# Patient Record
Sex: MALE | Race: WHITE | NOT HISPANIC OR LATINO | Employment: FULL TIME | URBAN - METROPOLITAN AREA
[De-identification: names, ages, dates, MRNs, and addresses within clinical notes are randomized per-mention and may not be internally consistent; named-entity substitution may affect disease eponyms.]

---

## 2019-01-15 ENCOUNTER — OFFICE VISIT CONVERTED (OUTPATIENT)
Dept: FAMILY MEDICINE CLINIC | Facility: CLINIC | Age: 34
End: 2019-01-15
Attending: PHYSICIAN ASSISTANT

## 2019-01-16 ENCOUNTER — HOSPITAL ENCOUNTER (OUTPATIENT)
Dept: LAB | Facility: HOSPITAL | Age: 34
Discharge: HOME | End: 2019-01-16
Attending: PHYSICIAN ASSISTANT

## 2019-01-16 LAB
ALBUMIN SERPL-MCNC: 4.2 G/DL (ref 3.5–5)
ALBUMIN/GLOB SERPL: 1.2 {RATIO} (ref 1.4–2.6)
ALP SERPL-CCNC: 73 U/L (ref 53–128)
ALT SERPL-CCNC: 45 U/L (ref 10–40)
ANION GAP SERPL CALC-SCNC: 17 MMOL/L (ref 8–19)
AST SERPL-CCNC: 55 U/L (ref 15–50)
BILIRUB SERPL-MCNC: 0.39 MG/DL (ref 0.2–1.3)
BUN SERPL-MCNC: 12 MG/DL (ref 5–25)
BUN/CREAT SERPL: 13 {RATIO} (ref 6–20)
CALCIUM SERPL-MCNC: 9.1 MG/DL (ref 8.7–10.4)
CHLORIDE SERPL-SCNC: 102 MMOL/L (ref 99–111)
CHOLEST SERPL-MCNC: 125 MG/DL (ref 107–200)
CHOLEST/HDLC SERPL: 4.3 {RATIO} (ref 3–6)
CONV CO2: 26 MMOL/L (ref 22–32)
CONV TOTAL PROTEIN: 7.7 G/DL (ref 6.3–8.2)
CREAT UR-MCNC: 0.96 MG/DL (ref 0.7–1.2)
GFR SERPLBLD BASED ON 1.73 SQ M-ARVRAT: >60 ML/MIN/{1.73_M2}
GLOBULIN UR ELPH-MCNC: 3.5 G/DL (ref 2–3.5)
GLUCOSE SERPL-MCNC: 86 MG/DL (ref 70–99)
HDLC SERPL-MCNC: 29 MG/DL (ref 40–60)
LDLC SERPL CALC-MCNC: 50 MG/DL (ref 70–100)
OSMOLALITY SERPL CALC.SUM OF ELEC: 291 MOSM/KG (ref 273–304)
POTASSIUM SERPL-SCNC: 3.8 MMOL/L (ref 3.5–5.3)
SODIUM SERPL-SCNC: 141 MMOL/L (ref 135–147)
TRIGL SERPL-MCNC: 232 MG/DL (ref 40–150)
VLDLC SERPL-MCNC: 46 MG/DL (ref 5–37)

## 2020-01-09 ENCOUNTER — CONVERSION ENCOUNTER (OUTPATIENT)
Dept: FAMILY MEDICINE CLINIC | Facility: CLINIC | Age: 35
End: 2020-01-09

## 2020-01-09 ENCOUNTER — OFFICE VISIT CONVERTED (OUTPATIENT)
Dept: FAMILY MEDICINE CLINIC | Facility: CLINIC | Age: 35
End: 2020-01-09
Attending: PHYSICIAN ASSISTANT

## 2020-01-09 ENCOUNTER — HOSPITAL ENCOUNTER (OUTPATIENT)
Dept: LAB | Facility: HOSPITAL | Age: 35
Discharge: HOME | End: 2020-01-09
Attending: PHYSICIAN ASSISTANT

## 2020-01-09 LAB
ALBUMIN SERPL-MCNC: 4.4 G/DL (ref 3.5–5)
ALBUMIN/GLOB SERPL: 1.5 {RATIO} (ref 1.4–2.6)
ALP SERPL-CCNC: 67 U/L (ref 53–128)
ALT SERPL-CCNC: 19 U/L (ref 10–40)
ANION GAP SERPL CALC-SCNC: 18 MMOL/L (ref 8–19)
AST SERPL-CCNC: 18 U/L (ref 15–50)
BILIRUB SERPL-MCNC: 0.42 MG/DL (ref 0.2–1.3)
BUN SERPL-MCNC: 14 MG/DL (ref 5–25)
BUN/CREAT SERPL: 15 {RATIO} (ref 6–20)
CALCIUM SERPL-MCNC: 9.2 MG/DL (ref 8.7–10.4)
CHLORIDE SERPL-SCNC: 103 MMOL/L (ref 99–111)
CONV CO2: 24 MMOL/L (ref 22–32)
CONV TOTAL PROTEIN: 7.4 G/DL (ref 6.3–8.2)
CREAT UR-MCNC: 0.93 MG/DL (ref 0.7–1.2)
GFR SERPLBLD BASED ON 1.73 SQ M-ARVRAT: >60 ML/MIN/{1.73_M2}
GLOBULIN UR ELPH-MCNC: 3 G/DL (ref 2–3.5)
GLUCOSE SERPL-MCNC: 101 MG/DL (ref 70–99)
OSMOLALITY SERPL CALC.SUM OF ELEC: 293 MOSM/KG (ref 273–304)
POTASSIUM SERPL-SCNC: 3.7 MMOL/L (ref 3.5–5.3)
SODIUM SERPL-SCNC: 141 MMOL/L (ref 135–147)

## 2020-01-10 LAB
HSV1 IGG SER IA-ACNC: 31.2 INDEX (ref 0–0.9)
HSV2 IGG SER IA-ACNC: <0.91 INDEX (ref 0–0.9)

## 2020-01-11 LAB — CONV HIV COMBO AG/AB (HIV-1/O/2) WITH REFLEX: NEGATIVE

## 2020-01-13 LAB
C TRACH RRNA CVX QL NAA+PROBE: NOT DETECTED
CONV HEPATITIS B SURFACE AG W CONFIRMATION RE: NEGATIVE
CONV HEPATITIS COMMENT: NORMAL
HBV CORE AB SER DONR QL IA: NEGATIVE
HBV CORE IGM SERPL QL IA: NEGATIVE
HBV E AB SERPL QL IA: NEGATIVE
HBV E AG SERPL QL IA: NEGATIVE
HBV SURFACE AB SER QL: NON REACTIVE
HCV AB S/CO SERPL IA: <0.1 S/CO RATIO (ref 0–0.9)
HSV I/II IGM: 1 RATIO (ref 0–0.9)
HSV I/II IGM: 1.02 RATIO (ref 0–0.9)
HSV1 IGG SER IA-ACNC: 31.3 INDEX (ref 0–0.9)
HSV2 IGG SER IA-ACNC: <0.91 INDEX (ref 0–0.9)
N GONORRHOEA DNA SPEC QL NAA+PROBE: NOT DETECTED
RPR SER QL: ABNORMAL

## 2020-01-15 ENCOUNTER — HOSPITAL ENCOUNTER (OUTPATIENT)
Dept: LAB | Facility: HOSPITAL | Age: 35
Discharge: HOME | End: 2020-01-15
Attending: PHYSICIAN ASSISTANT

## 2020-01-15 LAB
APPEARANCE UR: CLEAR
BASOPHILS # BLD AUTO: 0.06 10*3/UL (ref 0–0.2)
BASOPHILS NFR BLD AUTO: 0.8 % (ref 0–3)
BILIRUB UR QL: NEGATIVE
CHOLEST SERPL-MCNC: 168 MG/DL (ref 107–200)
CHOLEST/HDLC SERPL: 5.8 {RATIO} (ref 3–6)
COLOR UR: YELLOW
CONV ABS IMM GRAN: 0.04 10*3/UL (ref 0–0.2)
CONV COLLECTION SOURCE (UA): NORMAL
CONV IMMATURE GRAN: 0.6 % (ref 0–1.8)
CONV UROBILINOGEN IN URINE BY AUTOMATED TEST STRIP: 0.2 {EHRLICHU}/DL (ref 0.1–1)
DEPRECATED RDW RBC AUTO: 42.9 FL (ref 35.1–43.9)
EOSINOPHIL # BLD AUTO: 0.31 10*3/UL (ref 0–0.7)
EOSINOPHIL # BLD AUTO: 4.3 % (ref 0–7)
ERYTHROCYTE [DISTWIDTH] IN BLOOD BY AUTOMATED COUNT: 12.5 % (ref 11.6–14.4)
GLUCOSE UR QL: NEGATIVE MG/DL
HCT VFR BLD AUTO: 40.5 % (ref 42–52)
HDLC SERPL-MCNC: 29 MG/DL (ref 40–60)
HGB BLD-MCNC: 13.6 G/DL (ref 14–18)
HGB UR QL STRIP: NEGATIVE
KETONES UR QL STRIP: NEGATIVE MG/DL
LDLC SERPL CALC-MCNC: 67 MG/DL (ref 70–100)
LEUKOCYTE ESTERASE UR QL STRIP: NEGATIVE
LYMPHOCYTES # BLD AUTO: 2.35 10*3/UL (ref 1–5)
LYMPHOCYTES NFR BLD AUTO: 32.7 % (ref 20–45)
MCH RBC QN AUTO: 31.2 PG (ref 27–31)
MCHC RBC AUTO-ENTMCNC: 33.6 G/DL (ref 33–37)
MCV RBC AUTO: 92.9 FL (ref 80–96)
MONOCYTES # BLD AUTO: 0.63 10*3/UL (ref 0.2–1.2)
MONOCYTES NFR BLD AUTO: 8.8 % (ref 3–10)
NEUTROPHILS # BLD AUTO: 3.79 10*3/UL (ref 2–8)
NEUTROPHILS NFR BLD AUTO: 52.8 % (ref 30–85)
NITRITE UR QL STRIP: NEGATIVE
NRBC CBCN: 0 % (ref 0–0.7)
PH UR STRIP.AUTO: 5.5 [PH] (ref 5–8)
PLATELET # BLD AUTO: 226 10*3/UL (ref 130–400)
PMV BLD AUTO: 11.4 FL (ref 9.4–12.4)
PROT UR QL: NEGATIVE MG/DL
RBC # BLD AUTO: 4.36 10*6/UL (ref 4.7–6.1)
SP GR UR: 1.03 (ref 1–1.03)
T4 FREE SERPL-MCNC: 1.1 NG/DL (ref 0.9–1.8)
TRIGL SERPL-MCNC: 589 MG/DL (ref 40–150)
TSH SERPL-ACNC: 1.4 M[IU]/L (ref 0.27–4.2)
WBC # BLD AUTO: 7.18 10*3/UL (ref 4.8–10.8)

## 2020-01-27 ENCOUNTER — HOSPITAL ENCOUNTER (OUTPATIENT)
Dept: LAB | Facility: HOSPITAL | Age: 35
Discharge: HOME | End: 2020-01-27
Attending: PHYSICIAN ASSISTANT

## 2020-01-27 LAB
CHOLEST SERPL-MCNC: 153 MG/DL (ref 107–200)
CHOLEST/HDLC SERPL: 5.9 {RATIO} (ref 3–6)
FERRITIN SERPL-MCNC: 231 NG/ML (ref 30–300)
FOLATE SERPL-MCNC: 9.8 NG/ML (ref 4.8–20)
HDLC SERPL-MCNC: 26 MG/DL (ref 40–60)
IRON SATN MFR SERPL: 20 % (ref 20–55)
IRON SERPL-MCNC: 71 UG/DL (ref 70–180)
LDLC SERPL CALC-MCNC: 57 MG/DL (ref 70–100)
TIBC SERPL-MCNC: 353 UG/DL (ref 245–450)
TRANSFERRIN SERPL-MCNC: 247 MG/DL (ref 215–365)
TRIGL SERPL-MCNC: 348 MG/DL (ref 40–150)
VIT B12 SERPL-MCNC: 342 PG/ML (ref 211–911)
VLDLC SERPL-MCNC: 70 MG/DL (ref 5–37)

## 2020-02-24 ENCOUNTER — OFFICE VISIT CONVERTED (OUTPATIENT)
Dept: FAMILY MEDICINE CLINIC | Facility: CLINIC | Age: 35
End: 2020-02-24
Attending: PHYSICIAN ASSISTANT

## 2020-02-24 ENCOUNTER — HOSPITAL ENCOUNTER (OUTPATIENT)
Dept: LAB | Facility: HOSPITAL | Age: 35
Discharge: HOME | End: 2020-02-24
Attending: PHYSICIAN ASSISTANT

## 2020-02-26 LAB — BACTERIA SPEC AEROBE CULT: NORMAL

## 2020-02-28 ENCOUNTER — HOSPITAL ENCOUNTER (OUTPATIENT)
Dept: GENERAL RADIOLOGY | Facility: HOSPITAL | Age: 35
Discharge: HOME | End: 2020-02-28
Attending: PHYSICIAN ASSISTANT

## 2020-03-24 ENCOUNTER — TELEMEDICINE CONVERTED (OUTPATIENT)
Dept: FAMILY MEDICINE CLINIC | Facility: CLINIC | Age: 35
End: 2020-03-24
Attending: PHYSICIAN ASSISTANT

## 2020-06-15 ENCOUNTER — OUTSIDE FACILITY SERVICE (OUTPATIENT)
Dept: SLEEP MEDICINE | Facility: HOSPITAL | Age: 35
End: 2020-06-15

## 2020-06-15 ENCOUNTER — HOSPITAL ENCOUNTER (OUTPATIENT)
Dept: SLEEP MEDICINE | Facility: HOSPITAL | Age: 35
Discharge: HOME | End: 2020-06-15
Attending: INTERNAL MEDICINE

## 2020-06-15 PROCEDURE — 99244 OFF/OP CNSLTJ NEW/EST MOD 40: CPT | Performed by: INTERNAL MEDICINE

## 2020-06-23 ENCOUNTER — HOSPITAL ENCOUNTER (OUTPATIENT)
Dept: SLEEP MEDICINE | Facility: HOSPITAL | Age: 35
Discharge: HOME | End: 2020-06-23
Attending: INTERNAL MEDICINE

## 2020-06-29 ENCOUNTER — OUTSIDE FACILITY SERVICE (OUTPATIENT)
Dept: SLEEP MEDICINE | Facility: HOSPITAL | Age: 35
End: 2020-06-29

## 2020-06-29 PROCEDURE — 95806 SLEEP STUDY UNATT&RESP EFFT: CPT | Performed by: INTERNAL MEDICINE

## 2020-07-06 ENCOUNTER — HOSPITAL ENCOUNTER (OUTPATIENT)
Dept: SLEEP MEDICINE | Facility: HOSPITAL | Age: 35
Discharge: HOME | End: 2020-07-06
Attending: INTERNAL MEDICINE

## 2020-07-06 ENCOUNTER — OUTSIDE FACILITY SERVICE (OUTPATIENT)
Dept: SLEEP MEDICINE | Facility: HOSPITAL | Age: 35
End: 2020-07-06

## 2020-07-06 PROCEDURE — 99214 OFFICE O/P EST MOD 30 MIN: CPT | Performed by: INTERNAL MEDICINE

## 2020-07-26 ENCOUNTER — HOSPITAL ENCOUNTER (OUTPATIENT)
Dept: SLEEP MEDICINE | Facility: HOSPITAL | Age: 35
Discharge: HOME | End: 2020-07-26
Attending: INTERNAL MEDICINE

## 2020-07-27 ENCOUNTER — OUTSIDE FACILITY SERVICE (OUTPATIENT)
Dept: SLEEP MEDICINE | Facility: HOSPITAL | Age: 35
End: 2020-07-27

## 2020-07-27 PROCEDURE — 95810 POLYSOM 6/> YRS 4/> PARAM: CPT | Performed by: INTERNAL MEDICINE

## 2020-09-23 ENCOUNTER — OUTSIDE FACILITY SERVICE (OUTPATIENT)
Dept: SLEEP MEDICINE | Facility: HOSPITAL | Age: 35
End: 2020-09-23

## 2020-09-23 ENCOUNTER — HOSPITAL ENCOUNTER (OUTPATIENT)
Dept: SLEEP MEDICINE | Facility: HOSPITAL | Age: 35
Discharge: HOME | End: 2020-09-23
Attending: INTERNAL MEDICINE

## 2020-09-23 PROCEDURE — 99213 OFFICE O/P EST LOW 20 MIN: CPT | Performed by: INTERNAL MEDICINE

## 2021-01-11 ENCOUNTER — HOSPITAL ENCOUNTER (OUTPATIENT)
Dept: GENERAL RADIOLOGY | Facility: HOSPITAL | Age: 36
Discharge: HOME | End: 2021-01-11
Attending: PHYSICIAN ASSISTANT

## 2021-01-11 ENCOUNTER — TELEMEDICINE CONVERTED (OUTPATIENT)
Dept: FAMILY MEDICINE CLINIC | Facility: CLINIC | Age: 36
End: 2021-01-11
Attending: PHYSICIAN ASSISTANT

## 2021-04-12 ENCOUNTER — TELEMEDICINE CONVERTED (OUTPATIENT)
Dept: FAMILY MEDICINE CLINIC | Facility: CLINIC | Age: 36
End: 2021-04-12
Attending: PHYSICIAN ASSISTANT

## 2021-04-27 ENCOUNTER — OFFICE VISIT CONVERTED (OUTPATIENT)
Dept: OTOLARYNGOLOGY | Facility: CLINIC | Age: 36
End: 2021-04-27
Attending: NURSE PRACTITIONER

## 2021-04-29 ENCOUNTER — CONVERSION ENCOUNTER (OUTPATIENT)
Dept: FAMILY MEDICINE CLINIC | Facility: CLINIC | Age: 36
End: 2021-04-29

## 2021-04-29 ENCOUNTER — HOSPITAL ENCOUNTER (OUTPATIENT)
Dept: GENERAL RADIOLOGY | Facility: HOSPITAL | Age: 36
Discharge: HOME | End: 2021-04-29
Attending: PHYSICIAN ASSISTANT

## 2021-04-29 ENCOUNTER — OFFICE VISIT CONVERTED (OUTPATIENT)
Dept: FAMILY MEDICINE CLINIC | Facility: CLINIC | Age: 36
End: 2021-04-29
Attending: PHYSICIAN ASSISTANT

## 2021-04-29 LAB
AMPHET UR QL CFM: NEGATIVE
BARBITURATES UR QL: NEGATIVE
BENZODIAZ UR QL SCN: NEGATIVE
BILIRUB UR QL STRIP: NEGATIVE
COLOR UR: NORMAL
CONV AMP/METHAMP UR: NEGATIVE
CONV CLARITY OF URINE: NORMAL
CONV COCAINE, UR: NEGATIVE
CONV PROTEIN IN URINE BY AUTOMATED TEST STRIP: NEGATIVE
CONV UROBILINOGEN IN URINE BY AUTOMATED TEST STRIP: NORMAL
GLUCOSE UR QL: NEGATIVE
HGB UR QL STRIP: NEGATIVE
KETONES UR QL STRIP: NEGATIVE
LEUKOCYTE ESTERASE UR QL STRIP: NEGATIVE
MDMA UR QL SCN: NEGATIVE
METHADONE UR QL SCN: NEGATIVE
NITRITE UR QL STRIP: NEGATIVE
OPIATES UR QL SCN: NEGATIVE
OXYCODONE UR QL SCN: NEGATIVE
PCP UR QL: NEGATIVE
PH UR STRIP.AUTO: 5.5 [PH]
SP GR UR: 1.03
THC SERPLBLD CFM-MCNC: NEGATIVE NG/ML

## 2021-05-11 NOTE — H&P
History and Physical      Patient Name: Ketan Mathias   Patient ID: [id number]   Sex: Male   YOB: 1985    Primary Care Provider: Zeferino Coon PA-C   Referring Provider: Zeferino Coon PA-C    Visit Date: April 27, 2021    Provider: SAMEER Crum   Location: [facility]   Location Address: [address] [place] [address], [address] [place] [address]  [id number]   Location Phone: [phone]          Chief Complaint     1. Left otalgia    2.  Hearing changes    3.  Cerumen impactions       History Of Present Illness  Ketan Mathias is a 35 year old /White male who presents to the office today as a consult from Zeferino Coon PA-C.      He presents the clinic today for evaluation of his left ear.  He reports for the past 10 years he has had intermittent issues with sharp shooting pain within his left ear.  He states that recently he has began to hear a static sound in his left ear and will occasionally feel like he cannot hear out of that side.  He states that the pain in his ear feels deep and sometimes radiates down into his neck.  He denies any pain with chewing or bruxism.  He does have some allergy symptoms for which he takes cetirizine and Flonase daily.  He reports in 2005 he did have a right-sided tympanoplasty for a TM perforation.  He states that he did have some issues with ear infections as a child but never had any ear tubes.  He denies any otorrhea.  He feels like typically his hearing is normal and is not having any trouble understanding people on a day-to-day basis.  He was recently diagnosed with obstructive sleep apnea and uses a CPAP machine.       Past Medical History  Otalgia of left ear; Sleep apnea         Past Surgical History  Arm Surgery; Ear drum repair         Medication List  diclofenac sodium 75 mg oral tablet,delayed release (DR/EC); fenofibrate 160 mg oral tablet; Flonase Allergy Relief 50 mcg/actuation nasal spray,suspension; Lexapro 20 mg oral tablet; Zyrtec 10 mg  How Severe Is Your Skin Lesion?: mild "oral tablet         Allergy List  NO KNOWN DRUG ALLERGIES         Family Medical History  Family history of breast cancer; Family history of diabetes mellitus         Social History  Alcohol (Never); Exercises regularly; History of methamphetamine use; ; ; No known infection risk; Tobacco (Former)         Immunizations  Name Date Admin   Influenza 12/01/2018         Review of Systems  · Constitutional  o Denies  o : fever, night sweats, weight loss  · Eyes  o Denies  o : discharge from eye, impaired vision  · HENT  o Admits  o : *See HPI  · Cardiovascular  o Denies  o : chest pain, irregular heart beats  · Respiratory  o Denies  o : shortness of breath, wheezing, coughing up blood  · Gastrointestinal  o Denies  o : heartburn, reflux, vomiting blood  · Genitourinary  o Denies  o : frequency  · Integument  o Denies  o : rash, skin dryness  · Neurologic  o Denies  o : seizures, loss of balance, loss of consciousness, dizziness  · Endocrine  o Denies  o : cold intolerance, heat intolerance  · Heme-Lymph  o Denies  o : easy bleeding, anemia      Vitals  Date Time BP Position Site L\R Cuff Size HR RR TEMP (F) WT  HT  BMI kg/m2 BSA m2 O2 Sat FR L/min FiO2 HC       04/27/2021 10:09 AM        97.5 232lbs 8oz 6'  2\" 29.85 2.35             Physical Examination  · Constitutional  o Appearance  o : well developed, well-nourished, alert and in no acute distress, voice clear and strong  · Head and Face  o Head  o :   § Inspection  § : no deformities or lesions  o Face  o :   § Inspection  § : No facial lesions; House-Brackmann I/VI bilaterally  § Palpation  § : No TMJ crepitus nor  muscle tenderness bilaterally  · Eyes  o Vision  o :   § Visual Fields  § : Extraocular movements are intact. No spontaneous or gaze-induced nystagmus.  o Conjunctivae  o : clear  o Sclerae  o : clear  o Pupils and Irises  o : pupils equal, round, and reactive to light.   · Ears, Nose, Mouth and Throat  o Ears  o :   § External " Has Your Skin Lesion Been Treated?: not been treated Ears  § : appearance within normal limits, no lesions present  § Otoscopic Examination  § : Complete cerumen impactions, cleared under the microscope using suction and curettes, tympanic membrane appearance within normal limits bilaterally without perforations, well-aerated middle ears  § Hearing  § : intact to conversational voice both ears  o Nose  o :   § External Nose  § : appearance normal  § Intranasal Exam  § : mucosa within normal limits, vestibules normal, no intranasal lesions present, septum midline, sinuses non tender to percussion  o Oral Cavity  o :   § Oral Mucosa  § : oral mucosa normal without pallor or cyanosis  § Lips  § : lip appearance normal  § Teeth  § : Edentulous on the top  § Gums  § : gums pink, non-swollen, no bleeding present  § Tongue  § : tongue appearance normal; normal mobility  § Palate  § : hard palate normal, soft palate appearance normal with symmetric mobility  o Throat  o :   § Oropharynx  § : no inflammation or lesions present, tonsils within normal limits  · Neck  o Inspection/Palpation  o : normal appearance, no masses or tenderness, trachea midline; thyroid size normal, nontender, no nodules or masses present on palpation  · Respiratory  o Respiratory Effort  o : breathing unlabored  o Inspection of Chest  o : normal appearance, no retractions  · Lymphatic  o Neck  o : no lymphadenopathy present  o Supraclavicular Nodes  o : no lymphadenopathy present  o Preauricular Nodes  o : no lymphadenopathy present  · Skin and Subcutaneous Tissue  o General Inspection  o : Regarding face and neck - there are no rashes present, no lesions present, and no areas of discoloration  · Neurologic  o Cranial Nerves  o : cranial nerves II-XII are grossly intact bilaterally  o Gait and Station  o : normal gait, able to stand without diffculty  · Psychiatric  o Judgement and Insight  o : judgment and insight intact  o Mood and Affect  o : mood normal, affect  Is This A New Presentation, Or A Follow-Up?: Skin Lesions appropriate          Assessment  · Hearing loss     389.9/H91.90  · Impacted cerumen, bilateral     380.4/H61.23  · Otalgia, left     388.70/H92.02      Plan  · Orders  o Removal of impacted cerumen (19219) - 380.4/H61.23 - 04/27/2021  · Medications  o Medications have been Reconciled  o Transition of Care or Provider Policy  · Instructions  o She presents to the clinic today for evaluation of her left ear. She reports that for the past 2 to 3 weeks she has felt like there is something moving around in her left ear. She states that she can hear a clicking and rummaging sound in her ear. She states there is occasionally a ringing sound in her ear as well. She states she has a known history of hearing loss on the right side and reports that she does not think she is unable to hear anything out of her right ear since she was a child. She states that was the last time she had a hearing test. She denies any issues with recurrent otitis media or ear surgeries. She denies significant noise exposure history. On examination today he has complete cerumen impactions present bilaterally. I was able to completely clear these under the microscope using suction and alligator forceps. The cerumen was extensive, in the canal all the way back up against the tympanic membrane. Once he got this cleared he did feel as though he could hear better. Bilateral tympanic membrane appearance is normal. There are no perforations and middle ears are well aerated. We discussed that the extent of his cerumen impactions could be causing some pain and changes to his hearing within his ears. I would like to see him back in 1 month at which time if he is continuing to have issues with hearing changes and ear pain we will get an audiogram and tympanogram at that time.  o Electronically Identified Patient Education Materials Provided Electronically  · Correspondence  o ENT Letter to Referring MD (Zeferino Coon PA-C) - 04/27/2021            Electronically  Signed by: Annelise Church APRN -Author on April 27, 2021 11:20:07 AM

## 2021-05-14 ENCOUNTER — HOSPITAL ENCOUNTER (OUTPATIENT)
Dept: GENERAL RADIOLOGY | Facility: HOSPITAL | Age: 36
Discharge: HOME | End: 2021-05-14
Attending: PHYSICIAN ASSISTANT

## 2021-05-14 VITALS
BODY MASS INDEX: 29.93 KG/M2 | DIASTOLIC BLOOD PRESSURE: 71 MMHG | OXYGEN SATURATION: 97 % | WEIGHT: 233.25 LBS | HEART RATE: 51 BPM | SYSTOLIC BLOOD PRESSURE: 110 MMHG | HEIGHT: 74 IN

## 2021-05-14 VITALS — BODY MASS INDEX: 29.84 KG/M2 | HEIGHT: 74 IN | TEMPERATURE: 97.5 F | WEIGHT: 232.5 LBS

## 2021-05-14 NOTE — PROGRESS NOTES
Progress Note      Patient Name: Ketan Mathias   Patient ID: [id number]   Sex: Male   YOB: 1985    Primary Care Provider: Zeferino Coon PA-C   Referring Provider: Zeferino Coon PA-C    Visit Date: January 11, 2021    Provider: Zeferino Coon PA-C   Location: [facility]   Location Address: [address] [place] [address], [address]  [place], [place]  [id number]   Location Phone: [phone]          Chief Complaint  · 1 yr follow up  · neck, shoulder, back pain  · discuss depression  · numbness in rt leg      History Of Present Illness  Video Conferencing Visit  Ketan Mathias is a 35 year old male who is presenting for evaluation via video conferencing via [employer]. Verbal consent obtained before beginning visit.   The following staff were present during this visit: Sarah Hearn CMA/Zeferino Coon PA-C   Ketan Mathias is a 35 year old male who presents for evaluation and treatment of: 1 year follow up.      pt presents today for 1 year follow up.    pt states for the couple of weeks he has been experiencing pain starting on rt side of his neck going into his shoulder and down to his lower back. pt states at times the pain does radiate down his leg to the bottom of his calf. pt does have numbness in his lower back. pt denies any injury states he just woke up one morning in pain.  Right neck and shoulder pain, no injury noted.  He does do manual labor.  He is also having right lower back pain.  No urinary incont      pt states he would like to discuss his depression issues. pt was currently taking Zoloft 50mg qd; however he has stopped taking it about 2 months ago due to the fact he stated it made him feel numb with no emotion. pt states he is still having issues with depression and would like to see if there is something else he could try.  He states that he was numb to everything.  No SI/HI    Labs 4/20  flu refused    Hyperlipidemia - fair control - pt is taking fenofibrate, needs labs         Past  Medical History  Disease Name Date Onset Notes   Sleep apnea --  --          Medication List  Name Date Started Instructions   fenofibrate 160 mg oral tablet 02/24/2020 take 1 tablet (160 mg) by oral route once daily   Levsin 0.125 mg oral tablet 03/24/2020 take 1 tablet (0.125 mg) by oral route 4 times per day         Allergy List  Allergen Name Date Reaction Notes   NO KNOWN DRUG ALLERGIES --  --  --          Social History  Finding Status Start/Stop Quantity Notes   Alcohol Never --/-- --  --    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   Former smoker --  --/-- --  quit at age 32   History of methamphetamine use --  --/-- --  --     --  --/-- --  --     --  --/-- --  --    No known infection risk --  --/-- --  --    Tobacco Former 20/25 socially --          Immunizations  NameDate Admin Mfg Trade Name Lot Number Route Inj VIS Given VIS Publication   [id number]/01/2018 SKB Fluzone Quadrivalent  NE NE 01/15/2019    Comments:          Review of Systems  · Constitutional  o Denies  o : fever, fatigue, weight loss, weight gain  · Cardiovascular  o Denies  o : lower extremity edema, claudication, chest pressure, palpitations  · Respiratory  o Denies  o : shortness of breath, wheezing, cough, hemoptysis, dyspnea on exertion  · Gastrointestinal  o Denies  o : nausea, vomiting, diarrhea, constipation, abdominal pain      Physical Examination  · Constitutional  o Appearance  o : well-nourished, no acute distress  · Head and Face  o Head  o :   § Inspection  § : atraumatic, normocephalic  · Respiratory  o Respiratory Effort  o : breathing comfortably, no cough  · Skin and Subcutaneous Tissue  o General Inspection  o : no visible rash, no lesions  · Neurologic  o Mental Status Examination  o :   § Orientation  § : grossly oriented to person, place and time, no facial droop     Neck - palp tend along the trap full rom of right shoulder  BACK - lumbar and right gluteal tenderness on video            Assessment  · Anxiety disorder     300.00/F41.9  · Cervical pain (neck)     723.1/M54.2  · Depression     311/F32.9  · Lumbago     724.2/M54.5  · Need for influenza vaccination     V04.81/Z23  · Shoulder pain, right     719.41/M25.511  · Lower back pain     724.2/M54.5  · Neck pain on right side     723.1/M54.2      Plan  · Orders  o Cervical Spine Complete [facility] (45468) - 723.1/M54.2 - 01/11/2021  o Lumbar Spine Complete [facility] (16745) - 724.2/M54.5 - 01/11/2021  o ACO-14: Influenza immunization was not administered for reasons documented () - V04.81/Z23 - 01/11/2021   refused  o Free T4 (90199) - - 01/11/2021  o Physical, Primary Care Panel (CBC, CMP, Lipid, TSH) [facility] (56535, 62427, 03706, 05277) - - 01/11/2021  o Urinalysis with Reflex Microscopy ([facility]) (72363) - - 01/11/2021  o Vitamin D (25-Hydroxy) Level (61793) - - 01/11/2021  o ACO-39: Current medications updated and reviewed (1159F, ) - - 01/11/2021  · Medications  o Lexapro 10 mg oral tablet   SIG: take 1 tablet (10 mg) by oral route once daily for 30 days   DISP: (30) Tablet with 2 refills  Prescribed on 01/11/2021     o diclofenac sodium 75 mg oral tablet,delayed release (DR/EC)   SIG: take 1 tablet (75 mg) by oral route 2 times per day   DISP: (60) Tablet with 0 refills  Prescribed on 01/11/2021     o Medications have been Reconciled  o Transition of Care or Provider Policy  · Instructions  o Flu vaccine declined.  o Patient was educated/instructed on their diagnosis, treatment and medications prior to discharge from the clinic today.            Electronically Signed by: Zeferino Coon PA-C -Author on January 11, 2021 01:46:53 PM

## 2021-05-14 NOTE — PROGRESS NOTES
Progress Note      Patient Name: Ketan Mathias   Patient ID: [id number]   Sex: Male   YOB: 1985    Primary Care Provider: Zeferino Coon PA-C   Referring Provider: Zeferino Coon PA-C    Visit Date: April 29, 2021    Provider: Zeferino Coon PA-C   Location: [facility] [place]   Location Address: [address] [place] [address], [address]  [place], [place]  [id number]   Location Phone: [phone]          Chief Complaint  · bilateral sciatic pain      History Of Present Illness  Ketan Mathias is a 35 year old /White male who presents for evaluation and treatment of: bilateral sciatic pain.      pt presents today for bilateral sciatic pain.    pt states he started having bilateral sciatic pain last Monday when he got out of bed. pt states the pain radiates down both legs with numbness. pt states the pain is better when he sits and is at its worse when he is walking. pt states his pain is 7/8 out of 10. pt states he has this about every 4 months.    Labs 2/20-pending    Pt states that he has not had any workup of this issue.  Pt states that he has bilat pain in both legs down to the calves.   Right worse than left.    No urinary or fecal incont    No injury noted.  Began around 1 year ago, he states that the pain is constant.    He cant afford chiro or PT.    Pt has taken Tylenol and IBU and neither have helped.    Anxiety and depression controlled with Lexapro  hyperlipidemia - controlled with fenofibrate       Past Medical History  Disease Name Date Onset Notes   Otalgia of left ear --  --    Sleep apnea --  --          Past Surgical History  Procedure Name Date Notes   Arm Surgery 1999,2002 dog bite and broken bone   Ear drum repair 2005 --          Medication List  Name Date Started Instructions   diclofenac sodium 75 mg oral tablet,delayed release (DR/EC) 01/11/2021 take 1 tablet (75 mg) by oral route 2 times per day   fenofibrate 160 mg oral tablet 02/24/2020 take 1 tablet (160 mg) by oral route  "once daily   Flonase Allergy Relief 50 mcg/actuation nasal spray,suspension 04/12/2021 spray 1 spray (50 mcg) in each nostril by intranasal route once daily   Lexapro 20 mg oral tablet 04/12/2021 take 1 tablet (20 mg) by oral route once daily for 30 days   Zyrtec 10 mg oral tablet 04/12/2021 take 1 tablet (10 mg) by oral route once daily for 30 days         Allergy List  Allergen Name Date Reaction Notes   NO KNOWN DRUG ALLERGIES --  --  --        Allergies Reconciled  Family Medical History  Disease Name Relative/Age Notes   Family history of breast cancer Grandmother (maternal)/   --    Family history of diabetes mellitus Aunt/  Father/  Mother/  Sister/   --          Social History  Finding Status Start/Stop Quantity Notes   Alcohol Never --/-- --  --    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   History of methamphetamine use --  --/-- --  --     --  --/-- --  --     --  --/-- --  --    No known infection risk --  --/-- --  --    Tobacco Former 20/33 1 ppd quit 2018         Immunizations  NameDate Admin Mfg Trade Name Lot Number Route Inj VIS Given VIS Publication   [id number]/01/2018 SKB Fluzone Quadrivalent  NE NE 01/15/2019    Comments:          Review of Systems  · Constitutional  o Denies  o : fever, fatigue, weight loss, weight gain  · Cardiovascular  o Denies  o : lower extremity edema, claudication, chest pressure, palpitations  · Respiratory  o Denies  o : shortness of breath, wheezing, cough, hemoptysis, dyspnea on exertion  · Gastrointestinal  o Denies  o : nausea, vomiting, diarrhea, constipation, abdominal pain      Vitals  Date Time BP Position Site L\R Cuff Size HR RR TEMP (F) WT  HT  BMI kg/m2 BSA m2 O2 Sat FR L/min FiO2 HC       04/29/2021 07:16 /71 Sitting    51 - R   233lbs 4oz 6'  2\" 29.95 2.35 97 %            Physical Examination  · Constitutional  o Appearance  o : well developed, well-nourished, no acute distress  · Head and Face  o Head  o : normocephalic, " atraumatic  · Neck  o Inspection/Palpation  o : normal appearance, no masses or tenderness, trachea midline  o Thyroid  o : gland size normal, nontender, no nodules or masses present on palpation  · Respiratory  o Respiratory Effort  o : breathing unlabored  o Inspection of Chest  o : chest rise symmetric bilaterally  o Auscultation of Lungs  o : clear to auscultation bilaterally throughout inspiration and expiration  · Cardiovascular  o Heart  o :   § Auscultation of Heart  § : regular rate and rhythm, no murmurs, gallops or rubs  o Peripheral Vascular System  o :   § Extremities  § : no edema     BACK - palp tend in the lumbar spine, neg SLR, no weakness             Results  · In-Office Procedures  o Lab procedure  § IOP - Urine Drug Screen In-House [facility] (25600)   § Amphetamines Ur Ql: Negative   § Barbiturates Ur Ql: Negative   § Buprenorphine+Nor Ur Ql Scn: Negative   § Benzodiaz Ur Ql: Negative   § Cocaine Ur Ql: Negative   § Methadone Ur Ql: Negative   § Methamphet Ur Ql: Negative   § MDMA Ur Ql Scn: Negative   § Opiates Ur Ql: Negative   § Oxycodone Ur Ql: Negative   § PCP Ur Ql: Negative   § THC Ur Ql: Negative   § Temp in Range?: Within/Acceptable   § Control Seen?: Yes   § IOP - Urinalysis without Microscopy ([facility]) [facility] (77442)   § Color Ur: Dark yellow   § Clarity Ur: Cloudy   § Glucose Ur Ql Strip: Negative   § Bilirub Ur Ql Strip: Negative   § Ketones Ur Ql Strip: Negative   § Sp Gr Ur Qn: 1.030   § Hgb Ur Ql Strip: Negative   § pH Ur-LsCnc: 5.5   § Prot Ur Ql Strip: Negative   § Urobilinogen Ur Strip-mCnc: 0.2 E.U./dL   § Nitrite Ur Ql Strip: Negative   § WBC Est Ur Ql Strip: Negative       Assessment  · Allergic rhinitis due to allergen     477.9/J30.9  · Anxiety disorder     300.00/F41.9  · Depression     311/F32.9  · Hyperlipidemia     272.4/E78.5  · Lumbago     724.2/M54.5  · Screening for depression     V79.0/Z13.89  · Sciatic leg pain     724.3/M54.30      Plan  · Orders  o Lumbar Spine  Complete [facility] ([address]473) - 724.2/M54.5 - 04/29/2021  o ACO-18: Negative screen for clinical depression using a standardized tool () - V79.0/Z13.89 - 04/29/2021  o WEST Report ([facility]) - - 04/29/2021  o ACO-39: Current medications updated and reviewed (1159F, ) - - 04/29/2021  o Urine Drug Screen ([facility]) (48106) - - 04/29/2021  · Medications  o cyclobenzaprine 10 mg oral tablet   SIG: take 1 tablet (10 mg) by oral route 3 times per day PRN muscle spasms   DISP: (60) Tablet with 0 refills  Prescribed on 04/29/2021     o gabapentin 300 mg oral capsule   SIG: take 1 capsule (300 mg) by oral route 3 times per day   DISP: (90) Capsule with 1 refills  Prescribed on 04/29/2021     o fenofibrate 160 mg oral tablet   SIG: take 1 tablet (160 mg) by oral route once daily   DISP: (30) Tablet with 5 refills  Refilled on 04/29/2021     o Medications have been Reconciled  o Transition of Care or Provider Policy  · Instructions  o Patient was educated and given low cholesterol diet information.  o Recommended exercise program to assist with cholesterol, weight loss and overall health improvement.  o Advised that cheeses and other sources of dairy fats, animal fats, fast food, and the extras (candy, pastries, pies, doughnuts and cookies) all contain LDL raising nutrients. Advised to increase fruits, vegetables, whole grains, and to monitor portion sizes.   o Depression Screen completed and scanned into the EMR under the designated folder within the patient's documents.  o Today's PHQ-9 result is __2_  o Obtained a written consent for WEST query. Discussed the risk and benefits of the use of controlled substances with the patient, including the risk of tolerance and drug dependence. The patient has been counseled on the need to have an exit strategy, including potentially discontinuing the use of controlled substances. WEST has or will be reviewed as soon as it becomes avaliable.  o See note for indication of controlled  substance. Marc and drug screen have been reviewed. Controlled substance agreement signed and scanned into chart. After discussion of risks and benefits of medication, I have determined patient is suitable for Rx while demonstrating the ability to safely follow and administer the medication plan. Patient understands the expectation that medication directions cannot be adjusted without a provider's written approval.   o Take all medications as prescribed/directed.  o Patient instructed/educated on their diet and exercise program.  o Patient was educated/instructed on their diagnosis, treatment and medications prior to discharge from the clinic today.  o Patient counseled to reduce calorie intake.  o Patient was instructed to exercise regularly.  o Discussed Covid-19 precautions including, but not limited to, social distancing, avoid touching your face, and hand washing.   · Disposition  o Call or Return if symptoms worsen or persist.  o F/U in clinic in 1 month.  o Care Transition            Electronically Signed by: Zeferino Coon PA-C -Author on April 29, 2021 07:13:24 PM

## 2021-05-14 NOTE — PROGRESS NOTES
Progress Note      Patient Name: Ketan Mathias   Patient ID: [id number]   Sex: Male   YOB: 1985    Primary Care Provider: Zeferino Coon PA-C   Referring Provider: Zeferino Coon PA-C    Visit Date: April 12, 2021    Provider: Zeferino Coon PA-C   Location: [facility]   Location Address: [address] [place] [address], [address]  [place], [place]  [id number]   Location Phone: [phone]          Chief Complaint  · 3 month follow up      History Of Present Illness  Video Conferencing Visit  Ketan Mathias is a 35 year old male who is presenting for evaluation via video conferencing via [employer]. Verbal consent obtained before beginning visit.   The following staff were present during this visit: Adi Chin MA/Zeferino Coon PA-C   Ketan Mathias is a 35 year old male who presents for evaluation and treatment of: 3 month follow up      Pt presents today via [employer] for a 3 month follow up.     Pt is requesting rx for allergy meds, he recently started Zyrtec and Flonase and would like to continue these meds. He reported he had sinus infection about 2 weeks ago and did a telehealth through his insurance company, he is unsure what provider he saw.     Pt c/o (L) ear pain, he stated the pain is worse when wearing ear buds and listening to music.  Pt states that he had surgery in the past on his right ear.  And his left ear is feeling similarly     Pt was started on Lexapro 10mg qd at last appt, he stated he is feeling better but would like increased dose.     Labs-1/2020         Past Medical History  Disease Name Date Onset Notes   Sleep apnea --  --          Medication List  Name Date Started Instructions   diclofenac sodium 75 mg oral tablet,delayed release (DR/EC) 01/11/2021 take 1 tablet (75 mg) by oral route 2 times per day   fenofibrate 160 mg oral tablet 02/24/2020 take 1 tablet (160 mg) by oral route once daily   Flonase Allergy Relief 50 mcg/actuation nasal spray,suspension 04/12/2021 spray 1 spray  (50 mcg) in each nostril by intranasal route once daily   Levsin 0.125 mg oral tablet 03/24/2020 take 1 tablet (0.125 mg) by oral route 4 times per day   Lexapro 20 mg oral tablet 04/12/2021 take 1 tablet (20 mg) by oral route once daily for 30 days   Zyrtec 10 mg oral tablet 04/12/2021 take 1 tablet (10 mg) by oral route once daily for 30 days         Allergy List  Allergen Name Date Reaction Notes   NO KNOWN DRUG ALLERGIES --  --  --          Social History  Finding Status Start/Stop Quantity Notes   Alcohol Never --/-- --  --    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   Former smoker --  --/-- --  quit at age 32   History of methamphetamine use --  --/-- --  --     --  --/-- --  --     --  --/-- --  --    No known infection risk --  --/-- --  --    Tobacco Former 20/25 socially --          Immunizations  NameDate Admin Mfg Trade Name Lot Number Route Inj VIS Given VIS Publication   [id number]/01/2018 SK[employer] Fluzone Quadrivalent  NE NE 01/15/2019    Comments:          Review of Systems  · Constitutional  o Denies  o : fever, fatigue, weight loss, weight gain  · Cardiovascular  o Denies  o : lower extremity edema, claudication, chest pressure, palpitations  · Respiratory  o Denies  o : shortness of breath, wheezing, cough, hemoptysis, dyspnea on exertion  · Gastrointestinal  o Denies  o : nausea, vomiting, diarrhea, constipation, abdominal pain      Physical Examination  · Constitutional  o Appearance  o : well-nourished, no acute distress  · Head and Face  o Head  o :   § Inspection  § : atraumatic, normocephalic  · Respiratory  o Respiratory Effort  o : breathing comfortably, no cough  · Skin and Subcutaneous Tissue  o General Inspection  o : no visible rash, no lesions  · Neurologic  o Mental Status Examination  o :   § Orientation  § : grossly oriented to person, place and time, no facial droop          Assessment  · Allergic rhinitis due to allergen     477.9/J30.9  · Anxiety  disorder     300.00/F41.9  · Depression     311/F32.9  · Left ear pain     388.70/H92.02  · Otalgia of left ear     388.70/H92.02      Plan  · Orders  o Free T4 (01633) - - 04/12/2021  o Physical, Primary Care Panel (CBC, CMP, Lipid, TSH) [facility] (39055, 10362, 57292, 16518) - - 04/12/2021  o Urinalysis with Reflex Microscopy ([facility]) (74351) - - 04/12/2021  o Vitamin D (25-Hydroxy) Level (33500) - - 04/12/2021  o ACO-39: Current medications updated and reviewed (1159F, ) - - 04/12/2021  o ENT CONSULTATION ([facility]) - 388.70/H92.02 - 04/12/2021  · Medications  o Lexapro 20 mg oral tablet   SIG: take 1 tablet (20 mg) by oral route once daily for 30 days   DISP: (30) Tablet with 5 refills  Adjusted on 04/12/2021     o Flonase Allergy Relief 50 mcg/actuation nasal spray,suspension   SIG: spray 1 spray (50 mcg) in each nostril by intranasal route once daily   DISP: (1) Inhaler with 5 refills  Adjusted on 04/12/2021     o Zyrtec 10 mg oral tablet   SIG: take 1 tablet (10 mg) by oral route once daily for 30 days   DISP: (30) Tablet with 5 refills  Adjusted on 04/12/2021     o Medications have been Reconciled  o Transition of Care or Provider Policy  · Instructions  o Patient was educated/instructed on their diagnosis, treatment and medications prior to discharge from the clinic today.            Electronically Signed by: Zeferino Coon PA-C -Author on April 12, 2021 06:59:45 PM

## 2021-05-15 VITALS
OXYGEN SATURATION: 97 % | BODY MASS INDEX: 27.43 KG/M2 | DIASTOLIC BLOOD PRESSURE: 70 MMHG | HEIGHT: 73 IN | WEIGHT: 207 LBS | SYSTOLIC BLOOD PRESSURE: 110 MMHG | HEART RATE: 60 BPM

## 2021-05-15 VITALS
BODY MASS INDEX: 29.82 KG/M2 | SYSTOLIC BLOOD PRESSURE: 129 MMHG | OXYGEN SATURATION: 97 % | WEIGHT: 225 LBS | DIASTOLIC BLOOD PRESSURE: 89 MMHG | TEMPERATURE: 97.9 F | HEART RATE: 75 BPM | HEIGHT: 73 IN

## 2021-05-15 VITALS
HEART RATE: 63 BPM | DIASTOLIC BLOOD PRESSURE: 76 MMHG | WEIGHT: 216.12 LBS | HEIGHT: 73 IN | BODY MASS INDEX: 28.64 KG/M2 | OXYGEN SATURATION: 96 % | SYSTOLIC BLOOD PRESSURE: 124 MMHG

## 2021-05-27 ENCOUNTER — OFFICE VISIT CONVERTED (OUTPATIENT)
Dept: OTOLARYNGOLOGY | Facility: CLINIC | Age: 36
End: 2021-05-27

## 2021-06-05 NOTE — PROGRESS NOTES
Progress Note      Patient Name: Ketan Mathias   Patient ID: [id number]   Sex: Male   YOB: 1985    Primary Care Provider: Zeferino Coon PA-C   Referring Provider: Zeferino Coon PA-C    Visit Date: May 27, 2021    Provider: SAMEER Crum   Location: [facility]   Location Address: [address] [place] [address], [address] [place] [address]  [id number]   Location Phone: [phone]          Chief Complaint     1.  Hearing loss    2.  Tinnitus    3.  Uvulitis       History Of Present Illness  Ketan Mathias is a 36 year old /White male who presents to the office today for a follow-up visit.      He presents the clinic today for 1 month follow-up regarding hearing loss and tinnitus.  He was last seen 1 month ago where he had extensive bilateral cerumen impactions which I was able to clear.  He states that his hearing did improve however he still feels like his hearing has been decreased for several years.  He feels like he hears a static sound in his left ear.  He reports having to wear hearing protection at work each day and getting audiograms yearly.  He states that he started hearing the static sound in about 3 years ago.  Additionally he states that he woke up this morning feeling like his uvula was swollen.  He states this is happened twice this year.  He states that he has had a lot of allergy issues lately and has been taking Benadryl at night.       Past Medical History  Hyperlipidemia; Otalgia of left ear; Sleep apnea         Past Surgical History  Arm Surgery; Ear drum repair         Medication List  cyclobenzaprine 10 mg oral tablet; diclofenac sodium 75 mg oral tablet,delayed release (DR/EC); fenofibrate 160 mg oral tablet; Flonase Allergy Relief 50 mcg/actuation nasal spray,suspension; gabapentin 300 mg oral capsule; Lexapro 20 mg oral tablet; Zyrtec 10 mg oral tablet         Allergy List  NO KNOWN DRUG ALLERGIES       Allergies Reconciled  Family Medical History  Family history of  "breast cancer; Family history of diabetes mellitus         Social History  Alcohol (Never); Exercises regularly; History of methamphetamine use; ; ; No known infection risk; Tobacco (Former)         Immunizations  Name Date Admin   Influenza 12/01/2018         Review of Systems  · Constitutional  o Denies  o : fever, night sweats, weight loss  · Eyes  o Denies  o : discharge from eye, impaired vision  · HENT  o Admits  o : *See HPI  · Cardiovascular  o Denies  o : chest pain, irregular heart beats  · Respiratory  o Denies  o : shortness of breath, wheezing, coughing up blood  · Gastrointestinal  o Denies  o : heartburn, reflux, vomiting blood  · Genitourinary  o Denies  o : frequency  · Integument  o Denies  o : rash, skin dryness  · Neurologic  o Denies  o : seizures, loss of balance, loss of consciousness, dizziness  · Endocrine  o Denies  o : cold intolerance, heat intolerance  · Heme-Lymph  o Denies  o : easy bleeding, anemia      Vitals  Date Time BP Position Site L\R Cuff Size HR RR TEMP (F) WT  HT  BMI kg/m2 BSA m2 O2 Sat FR L/min FiO2        05/27/2021 09:40 AM        97 238lbs 4oz 6'  2\" 30.59 2.38             Physical Examination  · Constitutional  o Appearance  o : well developed, well-nourished, alert and in no acute distress, voice clear and strong  · Head and Face  o Head  o :   § Inspection  § : no deformities or lesions  o Face  o :   § Inspection  § : No facial lesions; House-Brackmann I/VI bilaterally  § Palpation  § : No TMJ crepitus nor  muscle tenderness bilaterally  · Eyes  o Vision  o :   § Visual Fields  § : Extraocular movements are intact. No spontaneous or gaze-induced nystagmus.  o Conjunctivae  o : clear  o Sclerae  o : clear  o Pupils and Irises  o : pupils equal, round, and reactive to light.   · Ears, Nose, Mouth and Throat  o Ears  o :   § External Ears  § : appearance within normal limits, no lesions present  § Otoscopic Examination  § : tympanic membrane " appearance within normal limits bilaterally without perforations, well-aerated middle ears  § Hearing  § : intact to conversational voice both ears  o Nose  o :   § External Nose  § : appearance normal  § Intranasal Exam  § : mucosa within normal limits, vestibules normal, no intranasal lesions present, septum midline, sinuses non tender to percussion  o Oral Cavity  o :   § Oral Mucosa  § : oral mucosa normal without pallor or cyanosis  § Lips  § : lip appearance normal  § Teeth  § : normal dentition for age  § Gums  § : gums pink, non-swollen, no bleeding present  § Tongue  § : tongue appearance normal; normal mobility  § Palate  § : hard palate normal, soft palate appearance normal with symmetric mobility  o Throat  o :   § Oropharynx  § : Uvula inflamed, erythematous and swollen, tonsils within normal limits  · Neck  o Inspection/Palpation  o : normal appearance, no masses or tenderness, trachea midline; thyroid size normal, nontender, no nodules or masses present on palpation  · Respiratory  o Respiratory Effort  o : breathing unlabored  o Inspection of Chest  o : normal appearance, no retractions  · Lymphatic  o Neck  o : no lymphadenopathy present  o Supraclavicular Nodes  o : no lymphadenopathy present  o Preauricular Nodes  o : no lymphadenopathy present  · Skin and Subcutaneous Tissue  o General Inspection  o : Regarding face and neck - there are no rashes present, no lesions present, and no areas of discoloration  · Neurologic  o Cranial Nerves  o : cranial nerves II-XII are grossly intact bilaterally  o Gait and Station  o : normal gait, able to stand without diffculty  · Psychiatric  o Judgement and Insight  o : judgment and insight intact  o Mood and Affect  o : mood normal, affect appropriate          Assessment  · Tinnitus     388.30/H93.19  · Sensorineural hearing loss (SNHL), bilateral     389.18/H90.3  · Uvulitis     528.3/K12.2      Plan  · Orders  o Audiometry, comprehensive, threshold  evaluation and speech recognition [facility] (08309) - 388.30/H93.19, 389.18/H90.3 - 05/27/2021  o Tympanogram (Impedance Testing) [facility] (71055) - 388.30/H93.19, 389.18/H90.3 - 05/27/2021  · Medications  o Augmentin 875-125 mg oral tablet   SIG: take 1 tablet by oral route every 12 hours for 10 days   DISP: (20) Tablet with 0 refills  Prescribed on 05/27/2021     o prednisone 20 mg oral tablet   SIG: take 1 tablet (20 mg) by oral route 2 times per day for 5 days   DISP: (10) Tablet with 0 refills  Prescribed on 05/27/2021     o Medications have been Reconciled  o Transition of Care or Provider Policy  · Instructions  o He presents the clinic today for 1 month follow-up regarding hearing loss and tinnitus. He was last seen 1 month ago where he had extensive bilateral cerumen impactions which I was able to clear. He states that his hearing did improve however he still feels like his hearing has been decreased for several years. He feels like he hears a static sound in his left ear. He reports having to wear hearing protection at work each day and getting audiograms yearly. He states that he started hearing the static sound in about 3 years ago. Additionally he states that he woke up this morning feeling like his uvula was swollen. He states this is happened twice this year. He states that he has had a lot of allergy issues lately and has been taking Benadryl at night. On examination today his uvula is erythematous, swollen and inflamed looking. I am going to start him on a course of an antibiotic and a steroid for this. I advised him to use Benadryl at night like he has been. We did obtain an audiogram and tympanogram. The audiogram shows a mild to moderate mainly sensorineural hearing loss with an air-bone gap present at 4000 Hz in the right ear. The left ear shows a mild to moderate sensorineural hearing loss. Speech reception thresholds at 20 dB on the right and 30 dB on the left. Word discrimination scores are 100% on the  right at 60 dB 100% on the left at 70 dB. Tympanograms were normal. We have gone over the results of the audiogram with the patient and I have also given him a copy. Based on his speech audiometry scores he is a good candidate for hearing aid and I do feel he will eventually need this. I will plan to see him back in 6 months to reevaluate his ears due to his history of cerumen impactions. We will at least test his hearing annually. I will have him call sooner if there is no improvement in his uvula.  o Electronically Identified Patient Education Materials Provided Electronically            Electronically Signed by: SAMEER Crum -Author on May 27, 2021 12:02:17 PM

## 2021-07-15 VITALS — TEMPERATURE: 97 F | BODY MASS INDEX: 30.58 KG/M2 | HEIGHT: 74 IN | WEIGHT: 238.25 LBS

## 2021-08-20 ENCOUNTER — TELEPHONE (OUTPATIENT)
Dept: OTOLARYNGOLOGY | Facility: CLINIC | Age: 36
End: 2021-08-20

## 2022-02-23 ENCOUNTER — TELEPHONE (OUTPATIENT)
Dept: FAMILY MEDICINE CLINIC | Facility: CLINIC | Age: 37
End: 2022-02-23

## 2022-02-23 NOTE — TELEPHONE ENCOUNTER
Caller: DREW JONES    Relationship: Emergency Contact    Best call back number:[phone]     Who is your current provider: SIMRAN NICOLE     Who would you like your new provider to be: PREFERS BRYANNA WANG IF SHE IS ACCEPTING PATIENTS    What are your reasons for transferring care: DISAGREEMENT WITH PROVIDER   CALLER STATES THAT PROVIDER OFFENDED HER AND THEREFORE, SHE NO LONGER WANTS HER  TO BE A PATIENT OF SIMRAN NICOLE     Additional notes:          DREW JONES IS NOT ON THE  VERBAL.

## 2022-03-09 ENCOUNTER — OFFICE VISIT (OUTPATIENT)
Dept: FAMILY MEDICINE CLINIC | Facility: CLINIC | Age: 37
End: 2022-03-09

## 2022-03-09 VITALS
HEIGHT: 74 IN | DIASTOLIC BLOOD PRESSURE: 70 MMHG | OXYGEN SATURATION: 98 % | BODY MASS INDEX: 29.29 KG/M2 | SYSTOLIC BLOOD PRESSURE: 124 MMHG | HEART RATE: 69 BPM | WEIGHT: 228.2 LBS

## 2022-03-09 DIAGNOSIS — R49.0 HOARSENESS OF VOICE: ICD-10-CM

## 2022-03-09 DIAGNOSIS — Z01.89 ROUTINE LAB DRAW: ICD-10-CM

## 2022-03-09 DIAGNOSIS — Z13.29 SCREENING FOR THYROID DISORDER: ICD-10-CM

## 2022-03-09 DIAGNOSIS — M54.50 CHRONIC MIDLINE LOW BACK PAIN, UNSPECIFIED WHETHER SCIATICA PRESENT: ICD-10-CM

## 2022-03-09 DIAGNOSIS — E78.1 HYPERTRIGLYCERIDEMIA: ICD-10-CM

## 2022-03-09 DIAGNOSIS — G89.29 CHRONIC MIDLINE LOW BACK PAIN, UNSPECIFIED WHETHER SCIATICA PRESENT: ICD-10-CM

## 2022-03-09 DIAGNOSIS — J30.2 SEASONAL ALLERGIES: Primary | ICD-10-CM

## 2022-03-09 DIAGNOSIS — J32.0 MAXILLARY SINUSITIS, UNSPECIFIED CHRONICITY: ICD-10-CM

## 2022-03-09 PROBLEM — H92.02 OTALGIA OF LEFT EAR: Status: ACTIVE | Noted: 2022-03-09

## 2022-03-09 PROBLEM — E78.5 HYPERLIPIDEMIA: Status: ACTIVE | Noted: 2021-04-29

## 2022-03-09 PROBLEM — G47.30 SLEEP APNEA: Status: ACTIVE | Noted: 2022-03-09

## 2022-03-09 PROCEDURE — 80305 DRUG TEST PRSMV DIR OPT OBS: CPT | Performed by: NURSE PRACTITIONER

## 2022-03-09 PROCEDURE — 99204 OFFICE O/P NEW MOD 45 MIN: CPT | Performed by: NURSE PRACTITIONER

## 2022-03-09 RX ORDER — CYCLOBENZAPRINE HCL 10 MG
10 TABLET ORAL 3 TIMES DAILY PRN
Qty: 270 TABLET | Refills: 1 | Status: SHIPPED | OUTPATIENT
Start: 2022-03-09

## 2022-03-09 RX ORDER — CETIRIZINE HYDROCHLORIDE 10 MG/1
10 TABLET ORAL DAILY
Qty: 30 TABLET | Refills: 5 | Status: SHIPPED | OUTPATIENT
Start: 2022-03-09 | End: 2022-08-25

## 2022-03-09 RX ORDER — GABAPENTIN 300 MG/1
300 CAPSULE ORAL 3 TIMES DAILY
Qty: 270 CAPSULE | Refills: 0 | Status: SHIPPED | OUTPATIENT
Start: 2022-03-09 | End: 2022-07-20 | Stop reason: SDUPTHER

## 2022-03-09 RX ORDER — AMOXICILLIN AND CLAVULANATE POTASSIUM 875; 125 MG/1; MG/1
1 TABLET, FILM COATED ORAL 2 TIMES DAILY
Qty: 20 TABLET | Refills: 0 | Status: SHIPPED | OUTPATIENT
Start: 2022-03-09 | End: 2022-03-19

## 2022-03-09 RX ORDER — FLUTICASONE PROPIONATE 50 MCG
2 SPRAY, SUSPENSION (ML) NASAL DAILY
Qty: 9.9 ML | Refills: 2 | Status: SHIPPED | OUTPATIENT
Start: 2022-03-09

## 2022-03-09 RX ORDER — METHYLPREDNISOLONE 4 MG/1
TABLET ORAL
Qty: 21 EACH | Refills: 0 | Status: SHIPPED | OUTPATIENT
Start: 2022-03-09 | End: 2022-04-13

## 2022-03-09 RX ORDER — FENOFIBRATE 160 MG/1
160 TABLET ORAL DAILY
Qty: 90 TABLET | Refills: 1 | Status: SHIPPED | OUTPATIENT
Start: 2022-03-09

## 2022-03-09 NOTE — PROGRESS NOTES
"Chief Complaint  Back Pain and Hoarse    Subjective          Omid Mathias presents to [facility]  History of Present Illness  Pt states that he hasn't been to the doctor in long time and needing medications refilled.    Pt concerned about his voice and throat stays hoarse since having COVID-19 in 1/2022. Pt states that he can't yell or scream.  Pt states that he constantly clearing his voice and constantly has a light greenish to yellowish drainage.    Pt also having lower back pain. He has seen a chiropractor-states it made his symptoms worse. 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 He has been clean for 12 hours. States he was making, selling and doing Meth-states he just got it expunges.     States he quit smoking in 2018    He  has a past medical history of HLD (hyperlipidemia) (04/29/2021), Otalgia, left ear, and Sleep apnea.     Objective   Vital Signs:   /70 (BP Location: Left arm, Patient Position: Sitting, Cuff Size: Adult)   Pulse 69   Ht 188 cm (74\")   Wt 104 kg (228 lb 3.2 oz)   SpO2 98%   BMI 29.30 kg/m²     Physical Exam  Constitutional:       Appearance: Normal appearance.   HENT:      Right Ear: Hearing, tympanic membrane, ear canal and external ear normal.      Left Ear: Hearing, tympanic membrane, ear canal and external ear normal.      Mouth/Throat:      Lips: Pink.      Mouth: Mucous membranes are moist.      Pharynx: Oropharynx is clear. Uvula midline.      Tonsils: No tonsillar exudate or tonsillar abscesses.   Neck:      Thyroid: No thyroid mass, thyromegaly or thyroid tenderness.      Vascular: No carotid bruit.      Comments: TTP over maxillary sinuses  Cardiovascular:      Rate and Rhythm: Normal rate and regular rhythm. "      Pulses: Normal pulses.      Heart sounds: Normal heart sounds.   Pulmonary:      Effort: Pulmonary effort is normal.      Breath sounds: Normal breath sounds.   Musculoskeletal:      Right lower leg: No edema.      Left lower leg: No edema.   Skin:     General: Skin is warm and dry.   Neurological:      General: No focal deficit present.      Mental Status: He is alert and oriented to person, place, and time.   Psychiatric:         Mood and Affect: Mood normal.         Behavior: Behavior normal.        Result Review :            Past Surgical History:   Procedure Laterality Date   • FINGER SURGERY Right    • FOREARM SURGERY  2002/1999    DOG BITE AND BROKEN BONE   • TYMPANOPLASTY  2005      Family History   Problem Relation Age of Onset   • Diabetes Mother    • Diabetes Father    • Diabetes Sister    • Breast cancer Maternal Grandmother    • Diabetes Other         UNSPECIFIED        Current Outpatient Medications:   •  cetirizine (zyrTEC) 10 MG tablet, Take 1 tablet by mouth Daily., Disp: 30 tablet, Rfl: 5  •  cyclobenzaprine (FLEXERIL) 10 MG tablet, Take 1 tablet by mouth 3 (Three) Times a Day As Needed for Muscle Spasms., Disp: 270 tablet, Rfl: 1  •  fenofibrate 160 MG tablet, Take 1 tablet by mouth Daily., Disp: 90 tablet, Rfl: 1  •  fluticasone (FLONASE) 50 MCG/ACT nasal spray, 2 sprays into the nostril(s) as directed by provider Daily., Disp: 9.9 mL, Rfl: 2  •  gabapentin (NEURONTIN) 300 MG capsule, Take 1 capsule by mouth 3 (Three) Times a Day., Disp: 270 capsule, Rfl: 0  •  amoxicillin-clavulanate (Augmentin) 875-125 MG per tablet, Take 1 tablet by mouth 2 (Two) Times a Day for 10 days., Disp: 20 tablet, Rfl: 0  •  ibuprofen (ADVIL,MOTRIN) 200 MG tablet, ibuprofen 200 mg oral tablet take 1 tablet (200 mg) by oral route every 6 hours as needed with food   Suspended, Disp: , Rfl:   •  methylPREDNISolone (MEDROL) 4 MG dose pack, Take as directed on package instructions., Disp: 21 each, Rfl: 0   Assessment  and Plan    Diagnoses and all orders for this visit:    1. Seasonal allergies (Primary)  -     cetirizine (zyrTEC) 10 MG tablet; Take 1 tablet by mouth Daily.  Dispense: 30 tablet; Refill: 5  -     fluticasone (FLONASE) 50 MCG/ACT nasal spray; 2 sprays into the nostril(s) as directed by provider Daily.  Dispense: 9.9 mL; Refill: 2    2. Hypertriglyceridemia  -     fenofibrate 160 MG tablet; Take 1 tablet by mouth Daily.  Dispense: 90 tablet; Refill: 1  -     Lipid Panel; Future    3. Chronic midline low back pain, unspecified whether sciatica present  Comments:  chronic back pain-jinny, uds and controlled consent obtained today. Refilled neurontin and flexeril  Orders:  -     cyclobenzaprine (FLEXERIL) 10 MG tablet; Take 1 tablet by mouth 3 (Three) Times a Day As Needed for Muscle Spasms.  Dispense: 270 tablet; Refill: 1  -     gabapentin (NEURONTIN) 300 MG capsule; Take 1 capsule by mouth 3 (Three) Times a Day.  Dispense: 270 capsule; Refill: 0  -     POC Urine Drug Screen [facility]    4. Hoarseness of voice  Comments:  referred to ENT for laryngoscopy for hoarseness x 2m  Orders:  -     Ambulatory Referral to ENT (Otolaryngology)    5. Maxillary sinusitis, unspecified chronicity  Comments:  prescribed Augmentin bid x 10 days and medrol tucker for sinusitis.  Orders:  -     amoxicillin-clavulanate (Augmentin) 875-125 MG per tablet; Take 1 tablet by mouth 2 (Two) Times a Day for 10 days.  Dispense: 20 tablet; Refill: 0  -     methylPREDNISolone (MEDROL) 4 MG dose pack; Take as directed on package instructions.  Dispense: 21 each; Refill: 0    6. Routine lab draw  -     CBC & Differential; Future  -     Comprehensive Metabolic Panel; Future  -     Urinalysis With Culture If Indicated -; Future    7. Screening for thyroid disorder  -     TSH; Future        Follow Up   No follow-ups on file.  Patient was given instructions and counseling regarding his condition or for health maintenance advice. Please see specific  information pulled into the AVS if appropriate.     Omid Mathias  reports that he has quit smoking. He smoked 1.00 pack per day. He has never used smokeless tobacco..         SAMEER Garcia    The patient is advised to continue current medications.

## 2022-03-15 ENCOUNTER — OFFICE VISIT (OUTPATIENT)
Dept: OTOLARYNGOLOGY | Facility: CLINIC | Age: 37
End: 2022-03-15

## 2022-03-15 VITALS — BODY MASS INDEX: 28.93 KG/M2 | HEIGHT: 74 IN | TEMPERATURE: 97.3 F | WEIGHT: 225.4 LBS

## 2022-03-15 DIAGNOSIS — H61.23 BILATERAL IMPACTED CERUMEN: ICD-10-CM

## 2022-03-15 DIAGNOSIS — K21.9 GASTROESOPHAGEAL REFLUX DISEASE, UNSPECIFIED WHETHER ESOPHAGITIS PRESENT: ICD-10-CM

## 2022-03-15 DIAGNOSIS — R49.0 HOARSENESS: Primary | ICD-10-CM

## 2022-03-15 PROCEDURE — 99214 OFFICE O/P EST MOD 30 MIN: CPT | Performed by: NURSE PRACTITIONER

## 2022-03-15 PROCEDURE — 69210 REMOVE IMPACTED EAR WAX UNI: CPT | Performed by: NURSE PRACTITIONER

## 2022-03-15 RX ORDER — OMEPRAZOLE 20 MG/1
20 CAPSULE, DELAYED RELEASE ORAL DAILY
Qty: 30 CAPSULE | Refills: 3 | Status: SHIPPED | OUTPATIENT
Start: 2022-03-15 | End: 2022-04-14

## 2022-03-15 NOTE — PROGRESS NOTES
Patient Name: Omid Mathias   Visit Date: 03/15/2022   Patient ID: [id number]  Provider: SAMEER Crum    Sex: male  Location: [facility]   YOB: 1985  Location Address: [address] [place] [address], [address],?[place]?[address]    Primary Care Provider Kimi Ramos APRN  Location Phone: [phone]    Referring Provider: Kimi Ramos, *        Chief Complaint  f/u hoarseness    Subjective          Omid Mathias is a 36 y.o. male who presents to [facility] for a follow-up visit of cerumen impactions and evaluation of a new complaint voice hoarseness.  Patient states that he had Covid in January 2022.  He states since that time he has had hoarseness.  He states that he is also having significant GERD symptoms recently and had to stop drinking sodas because of this.  He states his PCP treated with an antibiotic and steroid 5 days ago and he is actually feeling much better since that time.      Current Outpatient Medications on File Prior to Visit   Medication Sig   • amoxicillin-clavulanate (Augmentin) 875-125 MG per tablet Take 1 tablet by mouth 2 (Two) Times a Day for 10 days.   • cetirizine (zyrTEC) 10 MG tablet Take 1 tablet by mouth Daily.   • cyclobenzaprine (FLEXERIL) 10 MG tablet Take 1 tablet by mouth 3 (Three) Times a Day As Needed for Muscle Spasms.   • fenofibrate 160 MG tablet Take 1 tablet by mouth Daily.   • fluticasone (FLONASE) 50 MCG/ACT nasal spray 2 sprays into the nostril(s) as directed by provider Daily.   • gabapentin (NEURONTIN) 300 MG capsule Take 1 capsule by mouth 3 (Three) Times a Day.   • ibuprofen (ADVIL,MOTRIN) 200 MG tablet ibuprofen 200 mg oral tablet take 1 tablet (200 mg) by oral route every 6 hours as needed with food   Suspended   • methylPREDNISolone (MEDROL) 4 MG dose pack Take as directed on package instructions.     No current facility-administered medications on file prior to visit.     "    Social History     Tobacco Use   • Smoking status: Former Smoker     Packs/day: 1.00     Types: Cigarettes     Quit date: 2018     Years since quittin.2   • Smokeless tobacco: Never Used   • Tobacco comment: STARTED AGE 19 QUIT AGE 32  QUIT 2018   Vaping Use   • Vaping Use: Never used   Substance Use Topics   • Alcohol use: Never   • Drug use: Never        Objective     Vital Signs:   Temp 97.3 °F (36.3 °C) (Temporal)   Ht 188 cm (74\")   Wt 102 kg (225 lb 6.4 oz)   BMI 28.94 kg/m²       Physical Exam  Constitutional:       General: He is not in acute distress.     Appearance: Normal appearance. He is not ill-appearing.   HENT:      Head: Normocephalic and atraumatic.      Jaw: There is normal jaw occlusion. No tenderness or pain on movement.      Salivary Glands: Right salivary gland is not diffusely enlarged or tender. Left salivary gland is not diffusely enlarged or tender.      Right Ear: Tympanic membrane, ear canal and external ear normal. There is impacted cerumen.      Left Ear: Tympanic membrane, ear canal and external ear normal. There is impacted cerumen.      Nose: Nose normal. No septal deviation.      Right Sinus: No maxillary sinus tenderness or frontal sinus tenderness.      Left Sinus: No maxillary sinus tenderness or frontal sinus tenderness.      Mouth/Throat:      Lips: Pink. No lesions.      Mouth: Mucous membranes are moist. No oral lesions.      Dentition: Normal dentition.      Tongue: No lesions.      Palate: No mass and lesions.      Pharynx: Oropharynx is clear. Uvula midline.      Tonsils: No tonsillar exudate.   Eyes:      Extraocular Movements: Extraocular movements intact.      Conjunctiva/sclera: Conjunctivae normal.      Pupils: Pupils are equal, round, and reactive to light.   Neck:      Thyroid: No thyroid mass, thyromegaly or thyroid tenderness.      Trachea: Trachea normal.   Pulmonary:      Effort: Pulmonary effort is normal. No respiratory distress.   Musculoskeletal: "         General: Normal range of motion.      Cervical back: Normal range of motion and neck supple. No tenderness.   Lymphadenopathy:      Cervical: No cervical adenopathy.   Skin:     General: Skin is warm and dry.   Neurological:      General: No focal deficit present.      Mental Status: He is alert and oriented to person, place, and time.      Cranial Nerves: No cranial nerve deficit.      Motor: No weakness.      Gait: Gait normal.   Psychiatric:         Mood and Affect: Mood normal.         Behavior: Behavior normal.         Thought Content: Thought content normal.         Judgment: Judgment normal.          Ear Cerumen Removal    Date/Time: 3/15/2022 2:27 PM  Performed by: Annelise Church APRN  Authorized by: Annelise Church APRN     Anesthesia:  Local Anesthetic: none  Ceruminolytics applied: Ceruminolytics applied prior to the procedure.  Location details: left ear and right ear  Patient tolerance: patient tolerated the procedure well with no immediate complications  Procedure type: instrumentation, suction, curette   Sedation:  Patient sedated: no             Result Review :               Assessment and Plan    Diagnoses and all orders for this visit:    1. Hoarseness (Primary)    2. Bilateral impacted cerumen    3. Gastroesophageal reflux disease, unspecified whether esophagitis present  -     omeprazole (priLOSEC) 20 MG capsule; Take 1 capsule by mouth Daily for 30 days. Take 20 mg by mouth 30 minute-1 hour before the last large meal you would eat before going to bed  Dispense: 30 capsule; Refill: 3    Other orders  -     Ear Cerumen Removal    Going to have him finish his course of antibiotics and steroids and going to start him on omeprazole 30 minutes before his evening meal.  I will have him return in 1 month and if he has had no improvement we will perform a flexible laryngoscopy.       Follow Up   Return in about 1 month (around 4/15/2022).  Patient was given instructions and counseling regarding  his condition or for health maintenance advice. Please see specific information pulled into the AVS if appropriate.     SAMEER Crum

## 2022-03-22 ENCOUNTER — LAB (OUTPATIENT)
Dept: LAB | Facility: HOSPITAL | Age: 37
End: 2022-03-22

## 2022-03-22 DIAGNOSIS — E78.1 HYPERTRIGLYCERIDEMIA: ICD-10-CM

## 2022-03-22 DIAGNOSIS — Z13.29 SCREENING FOR THYROID DISORDER: ICD-10-CM

## 2022-03-22 DIAGNOSIS — Z01.89 ROUTINE LAB DRAW: ICD-10-CM

## 2022-03-22 LAB
ALBUMIN SERPL-MCNC: 4.8 G/DL (ref 3.5–5.2)
ALBUMIN/GLOB SERPL: 1.8 G/DL
ALP SERPL-CCNC: 57 U/L (ref 39–117)
ALT SERPL W P-5'-P-CCNC: 68 U/L (ref 1–41)
ANION GAP SERPL CALCULATED.3IONS-SCNC: 9.6 MMOL/L (ref 5–15)
AST SERPL-CCNC: 42 U/L (ref 1–40)
BASOPHILS # BLD AUTO: 0.06 10*3/MM3 (ref 0–0.2)
BASOPHILS NFR BLD AUTO: 0.7 % (ref 0–1.5)
BILIRUB SERPL-MCNC: 0.6 MG/DL (ref 0–1.2)
BILIRUB UR QL STRIP: NEGATIVE
BUN SERPL-MCNC: 12 MG/DL (ref 6–20)
BUN/CREAT SERPL: 12 (ref 7–25)
CALCIUM SPEC-SCNC: 9.6 MG/DL (ref 8.6–10.5)
CHLORIDE SERPL-SCNC: 104 MMOL/L (ref 98–107)
CHOLEST SERPL-MCNC: 199 MG/DL (ref 0–200)
CLARITY UR: CLEAR
CO2 SERPL-SCNC: 26.4 MMOL/L (ref 22–29)
COLOR UR: ABNORMAL
CREAT SERPL-MCNC: 1 MG/DL (ref 0.76–1.27)
DEPRECATED RDW RBC AUTO: 41 FL (ref 37–54)
EGFRCR SERPLBLD CKD-EPI 2021: 99.4 ML/MIN/1.73
EOSINOPHIL # BLD AUTO: 0.73 10*3/MM3 (ref 0–0.4)
EOSINOPHIL NFR BLD AUTO: 8.5 % (ref 0.3–6.2)
ERYTHROCYTE [DISTWIDTH] IN BLOOD BY AUTOMATED COUNT: 12.5 % (ref 12.3–15.4)
GLOBULIN UR ELPH-MCNC: 2.7 GM/DL
GLUCOSE SERPL-MCNC: 85 MG/DL (ref 65–99)
GLUCOSE UR STRIP-MCNC: NEGATIVE MG/DL
HCT VFR BLD AUTO: 40.1 % (ref 37.5–51)
HDLC SERPL-MCNC: 32 MG/DL (ref 40–60)
HGB BLD-MCNC: 13.4 G/DL (ref 13–17.7)
HGB UR QL STRIP.AUTO: NEGATIVE
IMM GRANULOCYTES # BLD AUTO: 0.05 10*3/MM3 (ref 0–0.05)
IMM GRANULOCYTES NFR BLD AUTO: 0.6 % (ref 0–0.5)
KETONES UR QL STRIP: ABNORMAL
LDLC SERPL CALC-MCNC: 96 MG/DL (ref 0–100)
LDLC/HDLC SERPL: 2.57 {RATIO}
LEUKOCYTE ESTERASE UR QL STRIP.AUTO: NEGATIVE
LYMPHOCYTES # BLD AUTO: 2.72 10*3/MM3 (ref 0.7–3.1)
LYMPHOCYTES NFR BLD AUTO: 31.9 % (ref 19.6–45.3)
MCH RBC QN AUTO: 30 PG (ref 26.6–33)
MCHC RBC AUTO-ENTMCNC: 33.4 G/DL (ref 31.5–35.7)
MCV RBC AUTO: 89.9 FL (ref 79–97)
MONOCYTES # BLD AUTO: 0.58 10*3/MM3 (ref 0.1–0.9)
MONOCYTES NFR BLD AUTO: 6.8 % (ref 5–12)
NEUTROPHILS NFR BLD AUTO: 4.4 10*3/MM3 (ref 1.7–7)
NEUTROPHILS NFR BLD AUTO: 51.5 % (ref 42.7–76)
NITRITE UR QL STRIP: NEGATIVE
NRBC BLD AUTO-RTO: 0 /100 WBC (ref 0–0.2)
PH UR STRIP.AUTO: 7 [PH] (ref 5–8)
PLATELET # BLD AUTO: 204 10*3/MM3 (ref 140–450)
PMV BLD AUTO: 11.2 FL (ref 6–12)
POTASSIUM SERPL-SCNC: 4 MMOL/L (ref 3.5–5.2)
PROT SERPL-MCNC: 7.5 G/DL (ref 6–8.5)
PROT UR QL STRIP: ABNORMAL
RBC # BLD AUTO: 4.46 10*6/MM3 (ref 4.14–5.8)
SODIUM SERPL-SCNC: 140 MMOL/L (ref 136–145)
SP GR UR STRIP: >=1.03 (ref 1–1.03)
TRIGL SERPL-MCNC: 424 MG/DL (ref 0–150)
TSH SERPL DL<=0.05 MIU/L-ACNC: 1.13 UIU/ML (ref 0.27–4.2)
UROBILINOGEN UR QL STRIP: ABNORMAL
VLDLC SERPL-MCNC: 71 MG/DL (ref 5–40)
WBC NRBC COR # BLD: 8.54 10*3/MM3 (ref 3.4–10.8)

## 2022-03-22 PROCEDURE — 81003 URINALYSIS AUTO W/O SCOPE: CPT

## 2022-03-22 PROCEDURE — 80061 LIPID PANEL: CPT

## 2022-03-22 PROCEDURE — 36415 COLL VENOUS BLD VENIPUNCTURE: CPT

## 2022-03-22 PROCEDURE — 80050 GENERAL HEALTH PANEL: CPT

## 2022-03-24 ENCOUNTER — TELEPHONE (OUTPATIENT)
Dept: FAMILY MEDICINE CLINIC | Facility: CLINIC | Age: 37
End: 2022-03-24

## 2022-03-24 NOTE — TELEPHONE ENCOUNTER
----- Message from SAMEER Garcia sent at 3/23/2022 10:02 PM EDT -----  TSH WNL, CMP shows elevated alt and ast-is he taking tylenol or drinking any alcohol? Lipid shows very elevated trig-is he taking his fenofibrate? HDL low increase exercise. CBC normal w/exception of elevated eosinophils-any allergy symptoms? UA shows trace ketones and protein-will cont to monitor

## 2022-03-25 ENCOUNTER — TELEPHONE (OUTPATIENT)
Dept: FAMILY MEDICINE CLINIC | Facility: CLINIC | Age: 37
End: 2022-03-25

## 2022-03-28 ENCOUNTER — TELEPHONE (OUTPATIENT)
Dept: FAMILY MEDICINE CLINIC | Facility: CLINIC | Age: 37
End: 2022-03-28

## 2022-04-13 ENCOUNTER — OFFICE VISIT (OUTPATIENT)
Dept: FAMILY MEDICINE CLINIC | Facility: CLINIC | Age: 37
End: 2022-04-13

## 2022-04-13 ENCOUNTER — HOSPITAL ENCOUNTER (OUTPATIENT)
Dept: GENERAL RADIOLOGY | Facility: HOSPITAL | Age: 37
Discharge: HOME | End: 2022-04-13
Admitting: NURSE PRACTITIONER

## 2022-04-13 VITALS
WEIGHT: 229 LBS | HEIGHT: 74 IN | HEART RATE: 64 BPM | BODY MASS INDEX: 29.39 KG/M2 | DIASTOLIC BLOOD PRESSURE: 77 MMHG | OXYGEN SATURATION: 98 % | SYSTOLIC BLOOD PRESSURE: 137 MMHG

## 2022-04-13 DIAGNOSIS — R79.89 ELEVATED LIVER FUNCTION TESTS: ICD-10-CM

## 2022-04-13 DIAGNOSIS — J32.9 SINUSITIS, UNSPECIFIED CHRONICITY, UNSPECIFIED LOCATION: ICD-10-CM

## 2022-04-13 DIAGNOSIS — J30.2 SEASONAL ALLERGIES: ICD-10-CM

## 2022-04-13 DIAGNOSIS — R07.89 XIPHOID PAIN: ICD-10-CM

## 2022-04-13 DIAGNOSIS — E78.1 HYPERTRIGLYCERIDEMIA: Primary | ICD-10-CM

## 2022-04-13 DIAGNOSIS — K21.9 GASTROESOPHAGEAL REFLUX DISEASE, UNSPECIFIED WHETHER ESOPHAGITIS PRESENT: ICD-10-CM

## 2022-04-13 PROCEDURE — 71120 X-RAY EXAM BREASTBONE 2/>VWS: CPT

## 2022-04-13 PROCEDURE — 99214 OFFICE O/P EST MOD 30 MIN: CPT | Performed by: NURSE PRACTITIONER

## 2022-04-13 RX ORDER — AZITHROMYCIN 250 MG/1
TABLET, FILM COATED ORAL
Qty: 6 TABLET | Refills: 0 | Status: SHIPPED | OUTPATIENT
Start: 2022-04-13 | End: 2022-06-20

## 2022-04-13 RX ORDER — ICOSAPENT ETHYL 1000 MG/1
2 CAPSULE ORAL 2 TIMES DAILY WITH MEALS
Qty: 120 CAPSULE | Refills: 2 | Status: SHIPPED | OUTPATIENT
Start: 2022-04-13 | End: 2022-07-20

## 2022-04-13 NOTE — PROGRESS NOTES
"Chief Complaint  Sinus Issues and Other (Area on ribs that is painful when pressed on, follow up on labs. )    Subjective          Omid Mathias presents to [facility]  History of Present Illness     Reviewed labs with pt-TSH WNL, CMP shows elevated alt and ast-denies use of alcohol or tylenol. Lipid shows very elevated trig-he is taking fenofibrate daily-discussed changing to Vascepa-sent in to check for coverage. HDL low increase exercise. CBC normal w/exception of elevated eosinophils-admits uncontrolled allergies with the zyrtec and flonse-referred to allergist.  UA shows trace ketones and protein-pt admits he had not eaten or drank for 13 hours prior to his labs. Discussed he may have been a little dehydrated.      Hoarseness-states his symptoms are much improved since starting the prilosec. States his reflux symptoms resolved. He has a f/u w/Annelise Church next Wednesday.    hypertriglycerides-he has been taking fenofibrate x 2 yrs-his last trig level was 424-switched pt to Vascepa today. Ordered repeat lipid in .     Referred to allergist for elevated eosinophils and uncontrolled allergies w/zyrtec and flonse.        He  has a past medical history of HLD (hyperlipidemia) (04/29/2021), Otalgia, left ear, and Sleep apnea.     Objective   Vital Signs:   /77   Pulse 64   Ht 188 cm (74\")   Wt 104 kg (229 lb)   SpO2 98%   BMI 29.40 kg/m²     Physical Exam  Constitutional:       Appearance: Normal appearance.   Neck:      Thyroid: No thyroid mass, thyromegaly or thyroid tenderness.      Vascular: No carotid bruit.   Cardiovascular:      Rate and Rhythm: Normal rate and regular rhythm.      Pulses: Normal pulses.      Heart sounds: Normal heart sounds.   Pulmonary:      Effort: Pulmonary effort is normal.      Breath sounds: Normal breath sounds.   Chest:      Chest wall: Tenderness present.       Musculoskeletal:      Right lower leg: No edema.      Left lower leg: No " edema.   Skin:     General: Skin is warm and dry.   Neurological:      General: No focal deficit present.      Mental Status: He is alert and oriented to person, place, and time.   Psychiatric:         Mood and Affect: Mood normal.         Behavior: Behavior normal.        Result Review :   The following data was reviewed by: SAMEER Garcia on 04/13/2022:  CMP    CMP 3/22/22   Glucose 85   BUN 12   Creatinine 1.00   Sodium 140   Potassium 4.0   Chloride 104   Calcium 9.6   Albumin 4.80   Total Bilirubin 0.6   Alkaline Phosphatase 57   AST (SGOT) 42 (A)   ALT (SGPT) 68 (A)   (A) Abnormal value            CBC    CBC 3/22/22   WBC 8.54   RBC 4.46   Hemoglobin 13.4   Hematocrit 40.1   MCV 89.9   MCH 30.0   MCHC 33.4   RDW 12.5   Platelets 204           CBC w/diff    CBC w/Diff 3/22/22   WBC 8.54   RBC 4.46   Hemoglobin 13.4   Hematocrit 40.1   MCV 89.9   MCH 30.0   MCHC 33.4   RDW 12.5   Platelets 204   Neutrophil Rel % 51.5   Immature Granulocyte Rel % 0.6 (A)   Lymphocyte Rel % 31.9   Monocyte Rel % 6.8   Eosinophil Rel % 8.5 (A)   Basophil Rel % 0.7   (A) Abnormal value            Lipid Panel    Lipid Panel 3/22/22   Total Cholesterol 199   Triglycerides 424 (A)   HDL Cholesterol 32 (A)   VLDL Cholesterol 71 (A)   LDL Cholesterol  96   LDL/HDL Ratio 2.57   (A) Abnormal value            TSH    TSH 3/22/22   TSH 1.130                    Past Surgical History:   Procedure Laterality Date   • FINGER SURGERY Right    • FOREARM SURGERY  2002/1999    DOG BITE AND BROKEN BONE   • TYMPANOPLASTY  2005      Family History   Problem Relation Age of Onset   • Diabetes Mother    • Diabetes Father    • Diabetes Sister    • Breast cancer Maternal Grandmother    • Diabetes Other         UNSPECIFIED        Current Outpatient Medications:   •  cetirizine (zyrTEC) 10 MG tablet, Take 1 tablet by mouth Daily., Disp: 30 tablet, Rfl: 5  •  cyclobenzaprine (FLEXERIL) 10 MG tablet, Take 1 tablet by mouth 3 (Three) Times a Day As  Needed for Muscle Spasms., Disp: 270 tablet, Rfl: 1  •  fenofibrate 160 MG tablet, Take 1 tablet by mouth Daily., Disp: 90 tablet, Rfl: 1  •  fluticasone (FLONASE) 50 MCG/ACT nasal spray, 2 sprays into the nostril(s) as directed by provider Daily., Disp: 9.9 mL, Rfl: 2  •  gabapentin (NEURONTIN) 300 MG capsule, Take 1 capsule by mouth 3 (Three) Times a Day., Disp: 270 capsule, Rfl: 0  •  ibuprofen (ADVIL,MOTRIN) 200 MG tablet, ibuprofen 200 mg oral tablet take 1 tablet (200 mg) by oral route every 6 hours as needed with food   Suspended, Disp: , Rfl:   •  omeprazole (priLOSEC) 20 MG capsule, Take 1 capsule by mouth Daily for 30 days. Take 20 mg by mouth 30 minute-1 hour before the last large meal you would eat before going to bed, Disp: 30 capsule, Rfl: 3  •  azithromycin (Zithromax Z-Miguel) 250 MG tablet, Take 2 tablets by mouth on day 1, then 1 tablet daily on days 2-5, Disp: 6 tablet, Rfl: 0  •  icosapent ethyl (Vascepa) 1 g capsule capsule, Take 2 g by mouth 2 (Two) Times a Day With Meals., Disp: 120 capsule, Rfl: 2   Assessment and Plan    Diagnoses and all orders for this visit:    1. Hypertriglyceridemia (Primary)  Comments:  he has been taking fenofibrate x 2 yrs-his last trig level was 424-switched pt to Vascepa today. Ordered repeat lipid in .     Orders:  -     icosapent ethyl (Vascepa) 1 g capsule capsule; Take 2 g by mouth 2 (Two) Times a Day With Meals.  Dispense: 120 capsule; Refill: 2  -     Lipid panel; Future    2. Seasonal allergies  Comments:  Referred to allergist for elevated eosinophils and uncontrolled allergies w/zyrtec and flonse.    Orders:  -     Ambulatory Referral to Allergy    3. Elevated liver function tests  Comments:  instructed to avoid tylenol and alcohol due to elevated liver fx. He does have a hx of alcohol abuse but has been sober for 12 months.  Orders:  -     Comprehensive metabolic panel; Future    4. Xiphoid pain  Comments:  admits xphoid process pain for a few years which  has worsened with his son jumping on him-ordered xray  Orders:  -     XR Sternum PA & Lateral; Future    5. Sinusitis, unspecified chronicity, unspecified location  Comments:  prescribed zpack for sinusitis  Orders:  -     azithromycin (Zithromax Z-Miguel) 250 MG tablet; Take 2 tablets by mouth on day 1, then 1 tablet daily on days 2-5  Dispense: 6 tablet; Refill: 0    6. Gastroesophageal reflux disease, unspecified whether esophagitis present  Comments:  horaseness and GERD symptoms resolved w/prilosec-continue        Follow Up   Return in about 3 months (around 7/13/2022).  Patient was given instructions and counseling regarding his condition or for health maintenance advice. Please see specific information pulled into the AVS if appropriate.     Omid ESCAMILLA Stew  reports that he quit smoking about 4 years ago. His smoking use included cigarettes. He smoked 1.00 pack per day. He has never used smokeless tobacco.            SAMEER Garcia    The patient is advised to continue current medications.

## 2022-04-14 ENCOUNTER — TELEPHONE (OUTPATIENT)
Dept: FAMILY MEDICINE CLINIC | Facility: CLINIC | Age: 37
End: 2022-04-14

## 2022-04-14 NOTE — TELEPHONE ENCOUNTER
----- Message from SAMEER Garcia sent at 4/13/2022  5:14 PM EDT -----  Sternum normal no signs of fracture to xphoid process

## 2022-04-20 ENCOUNTER — OFFICE VISIT (OUTPATIENT)
Dept: OTOLARYNGOLOGY | Facility: CLINIC | Age: 37
End: 2022-04-20

## 2022-04-20 VITALS — WEIGHT: 233 LBS | TEMPERATURE: 97.3 F | HEIGHT: 74 IN | BODY MASS INDEX: 29.9 KG/M2

## 2022-04-20 DIAGNOSIS — R49.0 HOARSENESS: ICD-10-CM

## 2022-04-20 DIAGNOSIS — K21.9 GASTROESOPHAGEAL REFLUX DISEASE, UNSPECIFIED WHETHER ESOPHAGITIS PRESENT: ICD-10-CM

## 2022-04-20 DIAGNOSIS — H61.23 BILATERAL IMPACTED CERUMEN: Primary | ICD-10-CM

## 2022-04-20 DIAGNOSIS — H93.12 TINNITUS OF LEFT EAR: ICD-10-CM

## 2022-04-20 PROCEDURE — 99213 OFFICE O/P EST LOW 20 MIN: CPT | Performed by: NURSE PRACTITIONER

## 2022-04-20 PROCEDURE — 69210 REMOVE IMPACTED EAR WAX UNI: CPT | Performed by: NURSE PRACTITIONER

## 2022-04-20 NOTE — PROGRESS NOTES
Patient Name: Omid Mathias   Visit Date: 04/20/2022   Patient ID: [id number]  Provider: SAMEER Crum    Sex: male  Location: [facility]   YOB: 1985  Location Address: [address] [place] [address], [address],?[place]?[address]    Primary Care Provider Kimi Ramos APRN  Location Phone: [phone]    Referring Provider: No ref. provider found        Chief Complaint  Nose Problem (4 week f/up started Prilosec as advised by you and this has helped )    Subjective          Omid Mathias is a 36 y.o. male who presents to [facility] for a follow-up visit of voice hoarseness, GERD and cerumen impactions.  Patient states that his voice hoarseness and GERD have resolved since starting on the omeprazole.  When I last saw him he had significant cerumen impactions on both sides.  He states that he had tympanoplasty surgery when he was 18 to his right ear.  He states that he feels like he hears normally out of that side but in the left ear he feels like he possibly has some hearing loss.  He states he will hear a static sound in the left ear and sometimes have ear pain.      Current Outpatient Medications on File Prior to Visit   Medication Sig   • cetirizine (zyrTEC) 10 MG tablet Take 1 tablet by mouth Daily.   • cyclobenzaprine (FLEXERIL) 10 MG tablet Take 1 tablet by mouth 3 (Three) Times a Day As Needed for Muscle Spasms.   • fenofibrate 160 MG tablet Take 1 tablet by mouth Daily.   • fluticasone (FLONASE) 50 MCG/ACT nasal spray 2 sprays into the nostril(s) as directed by provider Daily.   • gabapentin (NEURONTIN) 300 MG capsule Take 1 capsule by mouth 3 (Three) Times a Day.   • ibuprofen (ADVIL,MOTRIN) 200 MG tablet ibuprofen 200 mg oral tablet take 1 tablet (200 mg) by oral route every 6 hours as needed with food   Suspended   • azithromycin (Zithromax Z-Miguel) 250 MG tablet Take 2 tablets by mouth on day 1, then 1 tablet daily on days 2-5  "  • icosapent ethyl (Vascepa) 1 g capsule capsule Take 2 g by mouth 2 (Two) Times a Day With Meals.     No current facility-administered medications on file prior to visit.        Social History     Tobacco Use   • Smoking status: Former Smoker     Packs/day: 1.00     Types: Cigarettes     Quit date: 2018     Years since quittin.3   • Smokeless tobacco: Never Used   • Tobacco comment: STARTED AGE 19 QUIT AGE 32  QUIT 2018   Vaping Use   • Vaping Use: Never used   Substance Use Topics   • Alcohol use: Never   • Drug use: Never        Objective     Vital Signs:   Temp 97.3 °F (36.3 °C)   Ht 188 cm (74\")   Wt 106 kg (233 lb)   BMI 29.92 kg/m²       Physical Exam  Constitutional:       General: He is not in acute distress.     Appearance: Normal appearance. He is not ill-appearing.   HENT:      Head: Normocephalic and atraumatic.      Jaw: There is normal jaw occlusion. No tenderness or pain on movement.      Salivary Glands: Right salivary gland is not diffusely enlarged or tender. Left salivary gland is not diffusely enlarged or tender.      Right Ear: Tympanic membrane, ear canal and external ear normal. There is impacted cerumen.      Left Ear: Tympanic membrane, ear canal and external ear normal. There is impacted cerumen.      Ears:      Comments: Right eardrum thickened, postsurgical appearance     Nose: Nose normal. No septal deviation.      Right Sinus: No maxillary sinus tenderness or frontal sinus tenderness.      Left Sinus: No maxillary sinus tenderness or frontal sinus tenderness.      Mouth/Throat:      Lips: Pink. No lesions.      Mouth: Mucous membranes are moist. No oral lesions.      Dentition: Normal dentition.      Tongue: No lesions.      Palate: No mass and lesions.      Pharynx: Oropharynx is clear. Uvula midline.      Tonsils: No tonsillar exudate.   Eyes:      Extraocular Movements: Extraocular movements intact.      Conjunctiva/sclera: Conjunctivae normal.      Pupils: Pupils are equal, " round, and reactive to light.   Neck:      Thyroid: No thyroid mass, thyromegaly or thyroid tenderness.      Trachea: Trachea normal.   Pulmonary:      Effort: Pulmonary effort is normal. No respiratory distress.   Musculoskeletal:         General: Normal range of motion.      Cervical back: Normal range of motion and neck supple. No tenderness.   Lymphadenopathy:      Cervical: No cervical adenopathy.   Skin:     General: Skin is warm and dry.   Neurological:      General: No focal deficit present.      Mental Status: He is alert and oriented to person, place, and time.      Cranial Nerves: No cranial nerve deficit.      Motor: No weakness.      Gait: Gait normal.   Psychiatric:         Mood and Affect: Mood normal.         Behavior: Behavior normal.         Thought Content: Thought content normal.         Judgment: Judgment normal.          Ear Cerumen Removal    Date/Time: 4/20/2022 4:25 PM  Performed by: Annelise Church APRN  Authorized by: Annelise Church APRN     Anesthesia:  Local Anesthetic: none  Location details: left ear and right ear  Procedure type: instrumentation, curette   Sedation:  Patient sedated: no             Result Review :               Assessment and Plan    Diagnoses and all orders for this visit:    1. Bilateral impacted cerumen (Primary)    2. Gastroesophageal reflux disease, unspecified whether esophagitis present    3. Hoarseness    4. Tinnitus of left ear    Other orders  -     Ear Cerumen Removal    Hoarseness and GERD symptoms have resolved.  He did have some cerumen present in bilateral ear canals which I cleared out.  I will see him back in 2 months at which time we will get an audiogram to assess his tinnitus symptoms and also cerumen removal.       Follow Up   No follow-ups on file.  Patient was given instructions and counseling regarding his condition or for health maintenance advice. Please see specific information pulled into the AVS if appropriate.     SAMEER Crum

## 2022-06-20 ENCOUNTER — PROCEDURE VISIT (OUTPATIENT)
Dept: OTOLARYNGOLOGY | Facility: CLINIC | Age: 37
End: 2022-06-20

## 2022-06-20 ENCOUNTER — OFFICE VISIT (OUTPATIENT)
Dept: OTOLARYNGOLOGY | Facility: CLINIC | Age: 37
End: 2022-06-20

## 2022-06-20 VITALS — HEIGHT: 74 IN | TEMPERATURE: 97.7 F | WEIGHT: 227 LBS | BODY MASS INDEX: 29.13 KG/M2

## 2022-06-20 DIAGNOSIS — H90.3 SENSORINEURAL HEARING LOSS (SNHL) OF BOTH EARS: ICD-10-CM

## 2022-06-20 DIAGNOSIS — H61.23 BILATERAL IMPACTED CERUMEN: ICD-10-CM

## 2022-06-20 DIAGNOSIS — H93.13 TINNITUS OF BOTH EARS: ICD-10-CM

## 2022-06-20 DIAGNOSIS — H90.3 SENSORY HEARING LOSS, BILATERAL: ICD-10-CM

## 2022-06-20 DIAGNOSIS — H93.12 TINNITUS OF LEFT EAR: Primary | ICD-10-CM

## 2022-06-20 PROCEDURE — 92557 COMPREHENSIVE HEARING TEST: CPT | Performed by: AUDIOLOGIST

## 2022-06-20 PROCEDURE — 99213 OFFICE O/P EST LOW 20 MIN: CPT | Performed by: NURSE PRACTITIONER

## 2022-06-20 PROCEDURE — 92567 TYMPANOMETRY: CPT | Performed by: AUDIOLOGIST

## 2022-06-20 PROCEDURE — 69210 REMOVE IMPACTED EAR WAX UNI: CPT | Performed by: NURSE PRACTITIONER

## 2022-06-20 RX ORDER — OMEPRAZOLE 20 MG/1
CAPSULE, DELAYED RELEASE ORAL
COMMUNITY
Start: 2022-05-10

## 2022-06-20 NOTE — PROGRESS NOTES
Patient Name: Omid Mathias   Visit Date: 06/20/2022   Patient ID: [id number]  Provider: SAEMER Crum    Sex: male  Location: [facility]   YOB: 1985  Location Address: [address] [place] [address], [address],?[place]?[address]    Primary Care Provider Kimi Ramos APRN  Location Phone: [phone]    Referring Provider: No ref. provider found        Chief Complaint  2 MONTH AUDIO RESULTS    Subjective          Omid Mathias is a 37 y.o. male who presents to [facility] for a follow-up visit of hearing loss, tinnitus and cerumen impactions.  He presents today to have his ears cleaned and to get an audiogram and tympanogram.  He states that for quite some time he has a sound of static in his ears from time to time.  He states that it is better since having the cerumen cleaned out of his ears.  He does work around loud noises.      Current Outpatient Medications on File Prior to Visit   Medication Sig   • cetirizine (zyrTEC) 10 MG tablet Take 1 tablet by mouth Daily.   • cyclobenzaprine (FLEXERIL) 10 MG tablet Take 1 tablet by mouth 3 (Three) Times a Day As Needed for Muscle Spasms.   • fenofibrate 160 MG tablet Take 1 tablet by mouth Daily.   • fluticasone (FLONASE) 50 MCG/ACT nasal spray 2 sprays into the nostril(s) as directed by provider Daily.   • gabapentin (NEURONTIN) 300 MG capsule Take 1 capsule by mouth 3 (Three) Times a Day.   • ibuprofen (ADVIL,MOTRIN) 200 MG tablet ibuprofen 200 mg oral tablet take 1 tablet (200 mg) by oral route every 6 hours as needed with food   Suspended   • icosapent ethyl (Vascepa) 1 g capsule capsule Take 2 g by mouth 2 (Two) Times a Day With Meals.   • omeprazole (priLOSEC) 20 MG capsule TAKE ONE CAPSULE BY MOUTH EVERY DAY. (TAKE 30 MINUTES TO 1 HOURS BEFORE THE LAST LARGE MEAL YOU WOULD EAT BEFORE GOING TO BED)   • [DISCONTINUED] azithromycin (Zithromax Z-Miguel) 250 MG tablet Take 2 tablets by mouth on  "day 1, then 1 tablet daily on days 2-5     No current facility-administered medications on file prior to visit.        Social History     Tobacco Use   • Smoking status: Former Smoker     Packs/day: 1.00     Types: Cigarettes     Quit date: 2018     Years since quittin.4   • Smokeless tobacco: Never Used   • Tobacco comment: STARTED AGE 19 QUIT AGE 32  QUIT 2018   Vaping Use   • Vaping Use: Never used   Substance Use Topics   • Alcohol use: Never   • Drug use: Never        Objective     Vital Signs:   Temp 97.7 °F (36.5 °C) (Temporal)   Ht 188 cm (74\")   Wt 103 kg (227 lb)   BMI 29.15 kg/m²       Physical Exam  Constitutional:       General: He is not in acute distress.     Appearance: Normal appearance. He is not ill-appearing.   HENT:      Head: Normocephalic and atraumatic.      Jaw: There is normal jaw occlusion. No tenderness or pain on movement.      Salivary Glands: Right salivary gland is not diffusely enlarged or tender. Left salivary gland is not diffusely enlarged or tender.      Right Ear: Tympanic membrane, ear canal and external ear normal. There is impacted cerumen.      Left Ear: Tympanic membrane, ear canal and external ear normal. There is impacted cerumen.      Nose: Nose normal. No septal deviation.      Right Sinus: No maxillary sinus tenderness or frontal sinus tenderness.      Left Sinus: No maxillary sinus tenderness or frontal sinus tenderness.      Mouth/Throat:      Lips: Pink. No lesions.      Mouth: Mucous membranes are moist. No oral lesions.      Dentition: Normal dentition.      Tongue: No lesions.      Palate: No mass and lesions.      Pharynx: Oropharynx is clear. Uvula midline.      Tonsils: No tonsillar exudate.   Eyes:      Extraocular Movements: Extraocular movements intact.      Conjunctiva/sclera: Conjunctivae normal.      Pupils: Pupils are equal, round, and reactive to light.   Neck:      Thyroid: No thyroid mass, thyromegaly or thyroid tenderness.      Trachea: " Trachea normal.   Pulmonary:      Effort: Pulmonary effort is normal. No respiratory distress.   Musculoskeletal:         General: Normal range of motion.      Cervical back: Normal range of motion and neck supple. No tenderness.   Lymphadenopathy:      Cervical: No cervical adenopathy.   Skin:     General: Skin is warm and dry.   Neurological:      General: No focal deficit present.      Mental Status: He is alert and oriented to person, place, and time.      Cranial Nerves: No cranial nerve deficit.      Motor: No weakness.      Gait: Gait normal.   Psychiatric:         Mood and Affect: Mood normal.         Behavior: Behavior normal.         Thought Content: Thought content normal.         Judgment: Judgment normal.          Ear Cerumen Removal    Date/Time: 6/20/2022 2:20 PM  Performed by: Annelise Church APRN  Authorized by: Annelise Church APRN     Anesthesia:  Local Anesthetic: none  Location details: left ear and right ear  Patient tolerance: patient tolerated the procedure well with no immediate complications  Procedure type: instrumentation, curette   Sedation:  Patient sedated: no             Result Review :               Assessment and Plan    Diagnoses and all orders for this visit:    1. Tinnitus of left ear (Primary)  -     Audiometry With Tympanometry; Future    2. Bilateral impacted cerumen  -     Audiometry With Tympanometry; Future    3. Sensorineural hearing loss (SNHL) of both ears  -     Audiometry With Tympanometry; Future    Other orders  -     Ear Cerumen Removal    Audiogram tympanogram was performed in clinic today and shows the right ear with normal hearing sloping to a slight to mild sensorineural hearing loss from 2000 Hz to 8000 Hz.  The left ear has mostly normal hearing with a slight loss at 4000 Hz through 8000 Hz.  Speech reception thresholds at 20 dB in the right ear and 15 dB in the left ear.  Word discrimination scores 100% bilaterally at 55 dB and tympanograms were normal  bilaterally.  He did have some cerumen present in bilateral ear canals today which I cleaned out.  I will plan to see him back in 4 months for cerumen removal.       Follow Up   No follow-ups on file.  Patient was given instructions and counseling regarding his condition or for health maintenance advice. Please see specific information pulled into the AVS if appropriate.     SAMEER Crum

## 2022-07-18 ENCOUNTER — OFFICE VISIT (OUTPATIENT)
Dept: SLEEP MEDICINE | Facility: HOSPITAL | Age: 37
End: 2022-07-18

## 2022-07-18 DIAGNOSIS — Z99.89 OSA ON CPAP: Primary | ICD-10-CM

## 2022-07-18 DIAGNOSIS — G47.33 OSA ON CPAP: Primary | ICD-10-CM

## 2022-07-18 PROCEDURE — 99213 OFFICE O/P EST LOW 20 MIN: CPT | Performed by: INTERNAL MEDICINE

## 2022-07-18 NOTE — PROGRESS NOTES
[address]  [place]   [place] [address]  Phone: [phone]  Fax: [phone]      SLEEP CLINIC FOLLOW UP PROGRESS NOTE.    Omid Mathias  [id number]   1985  37 y.o.  male      PCP: Kimi Ramos APRN      Date of visit: 7/18/2022    Chief Complaint   Patient presents with   • Sleep Apnea       HPI:  This is a 37 y.o. years old patient is here for the management of obstructive sleep apnea.  Sleep apnea is mild in severity with a AHI of 10.3/hr. Patient is using positive airway pressure therapy with auto CPAP.  Unfortunately his CPAP is broken and stopped working in the first week of June 8, 2022 and is unable to use the CPAP.  He works in [employer] and his symptoms have returned and he needs a new CPAP to effectively treat his sleep apnea.    Bedtime 9:30 PM  Wake time 4 AM    Medications and allergies are reviewed by me and documented in the encounter.     SOCIAL (habits pertaining to sleep medicine)  History tobacco use:No   History of alcohol use: 0 per week  Caffeine use: 1     REVIEW OF SYSTEMS:   [place] Sleepiness Scale :    Nasal congestion:No   Dry mouth/nose:Yes   Post nasal drip; No   Acid reflux/Heartburn:No   Abd bloating:No   Morning headache:Yes   Anxiety:Yes   Depression:No    PHYSICAL EXAMINATION:  CONSTITUTIONAL:There were no vitals filed for this visit. There is no height or weight on file to calculate BMI.   NOSE: nasal passages are clear, No deformities noted   RESP SYSTEM: Not in any respiratory distress, no chest deformities noted,   CARDIOVASULAR: No edema noted  NEURO: Oriented x 3, gait normal,  Mood and affect appeared appropriate      Data reviewed:  The Smart card downloaded on 7/18/2022 has been reviewed independently by me for compliance and discussed the data with the patient.   The CPAP is broken        ASSESSMENT AND PLAN:  · Obstructive sleep apnea ( G 47.33).  Patient CPAP is broken needs either repair or replacement.  I  have sent a prescription to [facility] to get him a new CPAP if it cannot be repaired.   .Without proper control of sleep apnea and good compliance there is a increased risk for hypertension, diabetes mellitus and nonrestorative sleep with hypersomnia which can increase risk for motor vehicle accidents.  Untreated sleep apnea is also a risk factor for development of atrial fibrillation, pulmonary hypertension and stroke. The patient is also instructed to get the supplies from the DME company and and change them on a regular basis.  A prescription for supplies has been sent to the DME company.  I have also discussed the good sleep hygiene habits and adequate amount of sleep needed for good health.  Follow-up in 31 days   · Return for 31 to 90 days after PAP setup with down load. . Patient's questions were answered.      Verito Ordoñez MD  Sleep Medicine.  Medical Director, [facility] sleep [facility]  7/18/2022 ,

## 2022-07-20 ENCOUNTER — OFFICE VISIT (OUTPATIENT)
Dept: FAMILY MEDICINE CLINIC | Facility: CLINIC | Age: 37
End: 2022-07-20

## 2022-07-20 VITALS
WEIGHT: 227.2 LBS | BODY MASS INDEX: 29.16 KG/M2 | HEIGHT: 74 IN | SYSTOLIC BLOOD PRESSURE: 127 MMHG | OXYGEN SATURATION: 95 % | DIASTOLIC BLOOD PRESSURE: 70 MMHG | HEART RATE: 72 BPM

## 2022-07-20 DIAGNOSIS — M54.50 CHRONIC MIDLINE LOW BACK PAIN, UNSPECIFIED WHETHER SCIATICA PRESENT: ICD-10-CM

## 2022-07-20 DIAGNOSIS — M25.562 ACUTE PAIN OF LEFT KNEE: ICD-10-CM

## 2022-07-20 DIAGNOSIS — Z23 NEED FOR TDAP VACCINATION: Primary | ICD-10-CM

## 2022-07-20 DIAGNOSIS — G89.29 CHRONIC MIDLINE LOW BACK PAIN, UNSPECIFIED WHETHER SCIATICA PRESENT: ICD-10-CM

## 2022-07-20 DIAGNOSIS — E78.1 HYPERTRIGLYCERIDEMIA: ICD-10-CM

## 2022-07-20 PROCEDURE — 99214 OFFICE O/P EST MOD 30 MIN: CPT | Performed by: NURSE PRACTITIONER

## 2022-07-20 PROCEDURE — 96372 THER/PROPH/DIAG INJ SC/IM: CPT | Performed by: NURSE PRACTITIONER

## 2022-07-20 RX ORDER — GABAPENTIN 300 MG/1
300 CAPSULE ORAL 3 TIMES DAILY
Qty: 270 CAPSULE | Refills: 0 | Status: SHIPPED | OUTPATIENT
Start: 2022-07-20

## 2022-07-20 RX ORDER — TRIAMCINOLONE ACETONIDE 40 MG/ML
60 INJECTION, SUSPENSION INTRA-ARTICULAR; INTRAMUSCULAR ONCE
Status: COMPLETED | OUTPATIENT
Start: 2022-07-20 | End: 2022-07-20

## 2022-07-20 RX ADMIN — TRIAMCINOLONE ACETONIDE 60 MG: 40 INJECTION, SUSPENSION INTRA-ARTICULAR; INTRAMUSCULAR at 16:55

## 2022-07-20 NOTE — PROGRESS NOTES
"Chief Complaint  Knee pain, left (Pt states that his knee has been bothering him the last few weeks. Pt having pain right on the kneecap. Pt denies any injury.)    Subjective        Omid Mathias presents to [facility]  History of Present Illness      The patient is present today for his 3 month follow-up. He admits he did not really like the Flexeril for his back because it made him feel weird. He states it made him feel high. He admits it made him feel woozy, dizzy headed, lightheaded and he couldn't focus. He admits he is taking gabapentin 300mg 3 times a day. He states his back has gotten worse. He states he had a MRI on his back on 05/2021. He states he thinks his triglycerides are lowered. He admits he has been taking his fenofibrate. He states he does have knee pain. He denies having any imaging done of his knees.     Patient Care Team:  Kimi Ramos APRN as PCP - General (Nurse Practitioner)  Annelise Church APRN as Nurse Practitioner (Otolaryngology)   He  has a past medical history of HLD (hyperlipidemia) (04/29/2021), Otalgia, left ear, and Sleep apnea.     Objective   Vital Signs:   /70 (BP Location: Left arm, Patient Position: Sitting, Cuff Size: Adult)   Pulse 72   Ht 188 cm (74\")   Wt 103 kg (227 lb 3.2 oz)   SpO2 95%   BMI 29.17 kg/m²     Physical Exam  Constitutional:       Appearance: Normal appearance.   Cardiovascular:      Rate and Rhythm: Normal rate and regular rhythm.      Pulses: Normal pulses.      Heart sounds: Normal heart sounds.   Pulmonary:      Effort: Pulmonary effort is normal.      Breath sounds: Normal breath sounds.   Musculoskeletal:      Left knee: Swelling present. Tenderness present over the lateral joint line.   Skin:     General: Skin is warm and dry.   Neurological:      General: No focal deficit present.      Mental Status: He is alert and oriented to person, place, and time.   Psychiatric:         Mood and Affect: " Mood normal.         Behavior: Behavior normal.        Result Review :  The following data was reviewed by: SAMEER Garcia on 07/20/2022:  CMP    CMP 3/22/22   Glucose 85   BUN 12   Creatinine 1.00   Sodium 140   Potassium 4.0   Chloride 104   Calcium 9.6   Albumin 4.80   Total Bilirubin 0.6   Alkaline Phosphatase 57   AST (SGOT) 42 (A)   ALT (SGPT) 68 (A)   (A) Abnormal value            CBC    CBC 3/22/22   WBC 8.54   RBC 4.46   Hemoglobin 13.4   Hematocrit 40.1   MCV 89.9   MCH 30.0   MCHC 33.4   RDW 12.5   Platelets 204           CBC w/diff    CBC w/Diff 3/22/22   WBC 8.54   RBC 4.46   Hemoglobin 13.4   Hematocrit 40.1   MCV 89.9   MCH 30.0   MCHC 33.4   RDW 12.5   Platelets 204   Neutrophil Rel % 51.5   Immature Granulocyte Rel % 0.6 (A)   Lymphocyte Rel % 31.9   Monocyte Rel % 6.8   Eosinophil Rel % 8.5 (A)   Basophil Rel % 0.7   (A) Abnormal value            Lipid Panel    Lipid Panel 3/22/22   Total Cholesterol 199   Triglycerides 424 (A)   HDL Cholesterol 32 (A)   VLDL Cholesterol 71 (A)   LDL Cholesterol  96   LDL/HDL Ratio 2.57   (A) Abnormal value            TSH    TSH 3/22/22   TSH 1.130                    Past Surgical History:   Procedure Laterality Date   • FINGER SURGERY Right    • FOREARM SURGERY  2002/1999    DOG BITE AND BROKEN BONE   • TYMPANOPLASTY  2005      Family History   Problem Relation Age of Onset   • Diabetes Mother    • Diabetes Father    • Diabetes Sister    • Breast cancer Maternal Grandmother    • Diabetes Other         UNSPECIFIED        Current Outpatient Medications:   •  gabapentin (NEURONTIN) 300 MG capsule, Take 1 capsule by mouth 3 (Three) Times a Day., Disp: 270 capsule, Rfl: 0  •  cetirizine (zyrTEC) 10 MG tablet, Take 1 tablet by mouth Daily., Disp: 30 tablet, Rfl: 5  •  cyclobenzaprine (FLEXERIL) 10 MG tablet, Take 1 tablet by mouth 3 (Three) Times a Day As Needed for Muscle Spasms., Disp: 270 tablet, Rfl: 1  •  diclofenac (VOLTAREN) 50 MG EC tablet, Take 1  tablet by mouth 2 (Two) Times a Day As Needed (back  pain)., Disp: 60 tablet, Rfl: 2  •  fenofibrate 160 MG tablet, Take 1 tablet by mouth Daily., Disp: 90 tablet, Rfl: 1  •  fluticasone (FLONASE) 50 MCG/ACT nasal spray, 2 sprays into the nostril(s) as directed by provider Daily., Disp: 9.9 mL, Rfl: 2  •  omeprazole (priLOSEC) 20 MG capsule, TAKE ONE CAPSULE BY MOUTH EVERY DAY. (TAKE 30 MINUTES TO 1 HOURS BEFORE THE LAST LARGE MEAL YOU WOULD EAT BEFORE GOING TO BED), Disp: , Rfl:    Assessment and Plan   Diagnoses and all orders for this visit:    1. Need for Tdap vaccination (Primary)  -     Cancel: Tdap Vaccine Greater Than or Equal To 6yo IM    2. Hypertriglyceridemia  Comments:  he has been taking fenofibrate x 2 yrs-his last trig level was 424- Ordered repeat lipid today       3. Chronic midline low back pain, unspecified whether sciatica present  Comments:  chronic back pain-jinny, uds and controlled consent obtained today. Refilled neurontin and flexeril  Orders:  -     gabapentin (NEURONTIN) 300 MG capsule; Take 1 capsule by mouth 3 (Three) Times a Day.  Dispense: 270 capsule; Refill: 0  -     Ambulatory Referral to Pain Management  -     diclofenac (VOLTAREN) 50 MG EC tablet; Take 1 tablet by mouth 2 (Two) Times a Day As Needed (back  pain).  Dispense: 60 tablet; Refill: 2  -     triamcinolone acetonide (KENALOG-40) injection 60 mg    4. Acute pain of left knee  -     XR Knee 3 View Left; Future        Follow Up   Return in about 3 months (around 10/20/2022).  Patient was given instructions and counseling regarding his condition or for health maintenance advice. Please see specific information pulled into the AVS if appropriate.           SAMEER Garcia    The patient is advised to continue current medications.    Transcribed from ambient dictation for SAMEER Garcia by Rekha Ballesteros.  07/20/22   17:14 EDT    Patient verbalized consent to the visit recording.

## 2022-07-21 ENCOUNTER — TELEPHONE (OUTPATIENT)
Dept: FAMILY MEDICINE CLINIC | Facility: CLINIC | Age: 37
End: 2022-07-21

## 2022-07-22 ENCOUNTER — HOSPITAL ENCOUNTER (OUTPATIENT)
Dept: GENERAL RADIOLOGY | Facility: HOSPITAL | Age: 37
Discharge: HOME | End: 2022-07-22

## 2022-07-22 ENCOUNTER — LAB (OUTPATIENT)
Dept: LAB | Facility: HOSPITAL | Age: 37
End: 2022-07-22

## 2022-07-22 DIAGNOSIS — R79.89 ELEVATED LIVER FUNCTION TESTS: ICD-10-CM

## 2022-07-22 DIAGNOSIS — E78.1 HYPERTRIGLYCERIDEMIA: ICD-10-CM

## 2022-07-22 DIAGNOSIS — M25.562 ACUTE PAIN OF LEFT KNEE: ICD-10-CM

## 2022-07-22 PROCEDURE — 80053 COMPREHEN METABOLIC PANEL: CPT

## 2022-07-22 PROCEDURE — 36415 COLL VENOUS BLD VENIPUNCTURE: CPT

## 2022-07-22 PROCEDURE — 73562 X-RAY EXAM OF KNEE 3: CPT

## 2022-07-22 PROCEDURE — 80061 LIPID PANEL: CPT

## 2022-07-23 LAB
ALBUMIN SERPL-MCNC: 4.7 G/DL (ref 3.5–5.2)
ALBUMIN/GLOB SERPL: 1.7 G/DL
ALP SERPL-CCNC: 48 U/L (ref 39–117)
ALT SERPL W P-5'-P-CCNC: 31 U/L (ref 1–41)
ANION GAP SERPL CALCULATED.3IONS-SCNC: 13.7 MMOL/L (ref 5–15)
AST SERPL-CCNC: 21 U/L (ref 1–40)
BILIRUB SERPL-MCNC: 0.4 MG/DL (ref 0–1.2)
BUN SERPL-MCNC: 20 MG/DL (ref 6–20)
BUN/CREAT SERPL: 20 (ref 7–25)
CALCIUM SPEC-SCNC: 9 MG/DL (ref 8.6–10.5)
CHLORIDE SERPL-SCNC: 106 MMOL/L (ref 98–107)
CHOLEST SERPL-MCNC: 166 MG/DL (ref 0–200)
CO2 SERPL-SCNC: 22.3 MMOL/L (ref 22–29)
CREAT SERPL-MCNC: 1 MG/DL (ref 0.76–1.27)
EGFRCR SERPLBLD CKD-EPI 2021: 98.8 ML/MIN/1.73
GLOBULIN UR ELPH-MCNC: 2.8 GM/DL
GLUCOSE SERPL-MCNC: 103 MG/DL (ref 65–99)
HDLC SERPL-MCNC: 37 MG/DL (ref 40–60)
LDLC SERPL CALC-MCNC: 88 MG/DL (ref 0–100)
LDLC/HDLC SERPL: 2.17 {RATIO}
POTASSIUM SERPL-SCNC: 3.8 MMOL/L (ref 3.5–5.2)
PROT SERPL-MCNC: 7.5 G/DL (ref 6–8.5)
SODIUM SERPL-SCNC: 142 MMOL/L (ref 136–145)
TRIGL SERPL-MCNC: 244 MG/DL (ref 0–150)
VLDLC SERPL-MCNC: 41 MG/DL (ref 5–40)

## 2022-07-28 ENCOUNTER — LAB (OUTPATIENT)
Dept: LAB | Facility: HOSPITAL | Age: 37
End: 2022-07-28

## 2022-07-28 ENCOUNTER — TELEPHONE (OUTPATIENT)
Dept: FAMILY MEDICINE CLINIC | Facility: CLINIC | Age: 37
End: 2022-07-28

## 2022-07-28 DIAGNOSIS — R93.6 ABNORMAL X-RAY OF KNEE: ICD-10-CM

## 2022-07-28 DIAGNOSIS — R73.09 ELEVATED GLUCOSE LEVEL: ICD-10-CM

## 2022-07-28 DIAGNOSIS — M25.562 ACUTE PAIN OF LEFT KNEE: Primary | ICD-10-CM

## 2022-07-28 LAB — HBA1C MFR BLD: 5.4 % (ref 4.8–5.6)

## 2022-07-28 PROCEDURE — 36415 COLL VENOUS BLD VENIPUNCTURE: CPT

## 2022-07-28 PROCEDURE — 83036 HEMOGLOBIN GLYCOSYLATED A1C: CPT

## 2022-07-28 NOTE — TELEPHONE ENCOUNTER
----- Message from SAMEER Garcia sent at 7/25/2022 10:02 PM EDT -----  Shows he has some minimal fluid on his knee-due to symptoms order MRI left knee without contrast

## 2022-08-23 ENCOUNTER — HOSPITAL ENCOUNTER (OUTPATIENT)
Dept: MRI IMAGING | Facility: HOSPITAL | Age: 37
Discharge: HOME | End: 2022-08-23
Admitting: NURSE PRACTITIONER

## 2022-08-23 DIAGNOSIS — R93.6 ABNORMAL X-RAY OF KNEE: ICD-10-CM

## 2022-08-23 DIAGNOSIS — M25.562 ACUTE PAIN OF LEFT KNEE: ICD-10-CM

## 2022-08-23 PROCEDURE — 73721 MRI JNT OF LWR EXTRE W/O DYE: CPT

## 2022-08-25 DIAGNOSIS — J30.2 SEASONAL ALLERGIES: ICD-10-CM

## 2022-08-25 RX ORDER — CETIRIZINE HYDROCHLORIDE 10 MG/1
TABLET ORAL
Qty: 30 TABLET | Refills: 0 | Status: SHIPPED | OUTPATIENT
Start: 2022-08-25

## 2022-08-26 DIAGNOSIS — R93.6 ABNORMAL MRI, KNEE: Primary | ICD-10-CM

## 2022-08-26 DIAGNOSIS — M25.562 ACUTE PAIN OF LEFT KNEE: ICD-10-CM

## 2022-08-31 ENCOUNTER — OFFICE VISIT (OUTPATIENT)
Dept: ORTHOPEDIC SURGERY | Facility: CLINIC | Age: 37
End: 2022-08-31

## 2022-08-31 VITALS — BODY MASS INDEX: 28.62 KG/M2 | HEART RATE: 56 BPM | HEIGHT: 74 IN | OXYGEN SATURATION: 97 % | WEIGHT: 223 LBS

## 2022-08-31 DIAGNOSIS — M94.262 CHONDROMALACIA OF LEFT KNEE: Primary | ICD-10-CM

## 2022-08-31 PROCEDURE — 99203 OFFICE O/P NEW LOW 30 MIN: CPT | Performed by: STUDENT IN AN ORGANIZED HEALTH CARE EDUCATION/TRAINING PROGRAM

## 2022-08-31 PROCEDURE — 20610 DRAIN/INJ JOINT/BURSA W/O US: CPT | Performed by: STUDENT IN AN ORGANIZED HEALTH CARE EDUCATION/TRAINING PROGRAM

## 2022-08-31 RX ADMIN — TRIAMCINOLONE ACETONIDE 40 MG: 40 INJECTION, SUSPENSION INTRA-ARTICULAR; INTRAMUSCULAR at 09:30

## 2022-08-31 RX ADMIN — LIDOCAINE HYDROCHLORIDE 5 ML: 10 INJECTION, SOLUTION INFILTRATION; PERINEURAL at 09:30

## 2022-09-01 RX ORDER — LIDOCAINE HYDROCHLORIDE 10 MG/ML
5 INJECTION, SOLUTION INFILTRATION; PERINEURAL
Status: COMPLETED | OUTPATIENT
Start: 2022-08-31 | End: 2022-08-31

## 2022-09-01 RX ORDER — TRIAMCINOLONE ACETONIDE 40 MG/ML
40 INJECTION, SUSPENSION INTRA-ARTICULAR; INTRAMUSCULAR
Status: COMPLETED | OUTPATIENT
Start: 2022-08-31 | End: 2022-08-31

## 2022-10-05 DIAGNOSIS — Z99.89 OSA ON CPAP: Primary | ICD-10-CM

## 2022-10-05 DIAGNOSIS — G47.33 OSA ON CPAP: Primary | ICD-10-CM

## 2022-11-01 ENCOUNTER — TELEPHONE (OUTPATIENT)
Dept: SLEEP MEDICINE | Facility: HOSPITAL | Age: 37
End: 2022-11-01

## 2022-11-01 NOTE — TELEPHONE ENCOUNTER
[facility] CALLED ABOUT NOT BEING ABLE TO CONTACT PATIENT,I REACHED MR JONES GAVE HIM CONTACT NUMBER TO [facility]ON.

## 2022-12-07 DIAGNOSIS — J02.0 STREP PHARYNGITIS: Primary | ICD-10-CM

## 2022-12-07 RX ORDER — AMOXICILLIN 500 MG/1
500 CAPSULE ORAL 2 TIMES DAILY
Qty: 20 CAPSULE | Refills: 0 | Status: SHIPPED | OUTPATIENT
Start: 2022-12-07 | End: 2022-12-17

## 2023-02-13 PROCEDURE — U0004 COV-19 TEST NON-CDC HGH THRU: HCPCS | Performed by: NURSE PRACTITIONER

## 2023-11-03 PROCEDURE — 87081 CULTURE SCREEN ONLY: CPT | Performed by: NURSE PRACTITIONER

## 2023-12-06 ENCOUNTER — LAB (OUTPATIENT)
Dept: LAB | Facility: HOSPITAL | Age: 38
End: 2023-12-06
Payer: COMMERCIAL

## 2023-12-06 ENCOUNTER — OFFICE VISIT (OUTPATIENT)
Dept: FAMILY MEDICINE CLINIC | Facility: CLINIC | Age: 38
End: 2023-12-06
Payer: COMMERCIAL

## 2023-12-06 VITALS
SYSTOLIC BLOOD PRESSURE: 144 MMHG | OXYGEN SATURATION: 98 % | WEIGHT: 229.4 LBS | RESPIRATION RATE: 16 BRPM | BODY MASS INDEX: 29.44 KG/M2 | TEMPERATURE: 98 F | DIASTOLIC BLOOD PRESSURE: 86 MMHG | HEIGHT: 74 IN | HEART RATE: 64 BPM

## 2023-12-06 DIAGNOSIS — E78.1 HYPERTRIGLYCERIDEMIA: ICD-10-CM

## 2023-12-06 DIAGNOSIS — M54.9 BACK PAIN, UNSPECIFIED BACK LOCATION, UNSPECIFIED BACK PAIN LATERALITY, UNSPECIFIED CHRONICITY: ICD-10-CM

## 2023-12-06 DIAGNOSIS — M54.9 BACK PAIN, UNSPECIFIED BACK LOCATION, UNSPECIFIED BACK PAIN LATERALITY, UNSPECIFIED CHRONICITY: Primary | ICD-10-CM

## 2023-12-06 LAB
ALBUMIN SERPL-MCNC: 4.8 G/DL (ref 3.5–5.2)
ALBUMIN/GLOB SERPL: 1.8 G/DL
ALP SERPL-CCNC: 59 U/L (ref 39–117)
ALT SERPL W P-5'-P-CCNC: 27 U/L (ref 1–41)
ANION GAP SERPL CALCULATED.3IONS-SCNC: 11.4 MMOL/L (ref 5–15)
AST SERPL-CCNC: 22 U/L (ref 1–40)
BASOPHILS # BLD AUTO: 0.07 10*3/MM3 (ref 0–0.2)
BASOPHILS NFR BLD AUTO: 0.7 % (ref 0–1.5)
BILIRUB SERPL-MCNC: 0.4 MG/DL (ref 0–1.2)
BUN SERPL-MCNC: 16 MG/DL (ref 6–20)
BUN/CREAT SERPL: 16.3 (ref 7–25)
CALCIUM SPEC-SCNC: 9.3 MG/DL (ref 8.6–10.5)
CHLORIDE SERPL-SCNC: 102 MMOL/L (ref 98–107)
CHOLEST SERPL-MCNC: 177 MG/DL (ref 0–200)
CO2 SERPL-SCNC: 25.6 MMOL/L (ref 22–29)
CREAT SERPL-MCNC: 0.98 MG/DL (ref 0.76–1.27)
DEPRECATED RDW RBC AUTO: 39.5 FL (ref 37–54)
EGFRCR SERPLBLD CKD-EPI 2021: 100.6 ML/MIN/1.73
EOSINOPHIL # BLD AUTO: 0.44 10*3/MM3 (ref 0–0.4)
EOSINOPHIL NFR BLD AUTO: 4.6 % (ref 0.3–6.2)
ERYTHROCYTE [DISTWIDTH] IN BLOOD BY AUTOMATED COUNT: 12.7 % (ref 12.3–15.4)
GLOBULIN UR ELPH-MCNC: 2.6 GM/DL
GLUCOSE SERPL-MCNC: 84 MG/DL (ref 65–99)
HCT VFR BLD AUTO: 39.5 % (ref 37.5–51)
HDLC SERPL-MCNC: 32 MG/DL (ref 40–60)
HGB BLD-MCNC: 14 G/DL (ref 13–17.7)
IMM GRANULOCYTES # BLD AUTO: 0.06 10*3/MM3 (ref 0–0.05)
IMM GRANULOCYTES NFR BLD AUTO: 0.6 % (ref 0–0.5)
LDLC SERPL CALC-MCNC: 79 MG/DL (ref 0–100)
LDLC/HDLC SERPL: 1.97 {RATIO}
LYMPHOCYTES # BLD AUTO: 3.36 10*3/MM3 (ref 0.7–3.1)
LYMPHOCYTES NFR BLD AUTO: 35.4 % (ref 19.6–45.3)
MCH RBC QN AUTO: 30.9 PG (ref 26.6–33)
MCHC RBC AUTO-ENTMCNC: 35.4 G/DL (ref 31.5–35.7)
MCV RBC AUTO: 87.2 FL (ref 79–97)
MONOCYTES # BLD AUTO: 0.75 10*3/MM3 (ref 0.1–0.9)
MONOCYTES NFR BLD AUTO: 7.9 % (ref 5–12)
NEUTROPHILS NFR BLD AUTO: 4.82 10*3/MM3 (ref 1.7–7)
NEUTROPHILS NFR BLD AUTO: 50.8 % (ref 42.7–76)
NRBC BLD AUTO-RTO: 0 /100 WBC (ref 0–0.2)
PLATELET # BLD AUTO: 253 10*3/MM3 (ref 140–450)
PMV BLD AUTO: 10.9 FL (ref 6–12)
POTASSIUM SERPL-SCNC: 3.8 MMOL/L (ref 3.5–5.2)
PROT SERPL-MCNC: 7.4 G/DL (ref 6–8.5)
RBC # BLD AUTO: 4.53 10*6/MM3 (ref 4.14–5.8)
SODIUM SERPL-SCNC: 139 MMOL/L (ref 136–145)
T4 FREE SERPL-MCNC: 1.42 NG/DL (ref 0.93–1.7)
TRIGL SERPL-MCNC: 410 MG/DL (ref 0–150)
TSH SERPL DL<=0.05 MIU/L-ACNC: 1.98 UIU/ML (ref 0.27–4.2)
VLDLC SERPL-MCNC: 66 MG/DL (ref 5–40)
WBC NRBC COR # BLD AUTO: 9.5 10*3/MM3 (ref 3.4–10.8)

## 2023-12-06 PROCEDURE — 80050 GENERAL HEALTH PANEL: CPT

## 2023-12-06 PROCEDURE — 99203 OFFICE O/P NEW LOW 30 MIN: CPT | Performed by: FAMILY MEDICINE

## 2023-12-06 PROCEDURE — 80061 LIPID PANEL: CPT

## 2023-12-06 PROCEDURE — 84439 ASSAY OF FREE THYROXINE: CPT

## 2023-12-06 PROCEDURE — 36415 COLL VENOUS BLD VENIPUNCTURE: CPT

## 2023-12-06 RX ORDER — DICLOFENAC SODIUM 75 MG/1
75 TABLET, DELAYED RELEASE ORAL 2 TIMES DAILY PRN
Qty: 60 TABLET | Refills: 2 | Status: SHIPPED | OUTPATIENT
Start: 2023-12-06

## 2023-12-06 RX ORDER — CETIRIZINE HYDROCHLORIDE 10 MG/1
TABLET ORAL
COMMUNITY

## 2023-12-06 RX ORDER — PREDNISONE 20 MG/1
TABLET ORAL
Qty: 21 TABLET | Refills: 0 | Status: SHIPPED | OUTPATIENT
Start: 2023-12-06 | End: 2023-12-20

## 2023-12-06 NOTE — PROGRESS NOTES
"Chief Complaint  Back Pain and Establish Care    Subjective        Omid Mathias presents to [facility]  History of Present Illness    Patient presents to establish care having increased back pain denies any known injury other than some disc bulging in the past does not done any heavy lifting or other    Objective   Vital Signs:  /86   Pulse 64   Temp 98 °F (36.7 °C)   Resp 16   Ht 188 cm (74\")   Wt 104 kg (229 lb 6.4 oz)   SpO2 98%   BMI 29.45 kg/m²   Estimated body mass index is 29.45 kg/m² as calculated from the following:    Height as of this encounter: 188 cm (74\").    Weight as of this encounter: 104 kg (229 lb 6.4 oz).               Physical Exam  Vitals reviewed.   Constitutional:       Appearance: Normal appearance. He is well-developed.   HENT:      Head: Normocephalic and atraumatic.      Right Ear: External ear normal.      Left Ear: External ear normal.      Nose: Nose normal.   Eyes:      Conjunctiva/sclera: Conjunctivae normal.      Pupils: Pupils are equal, round, and reactive to light.   Cardiovascular:      Rate and Rhythm: Normal rate.   Pulmonary:      Effort: Pulmonary effort is normal.      Breath sounds: Normal breath sounds.   Abdominal:      General: There is no distension.   Skin:     General: Skin is warm and dry.   Neurological:      Mental Status: He is alert and oriented to person, place, and time. Mental status is at baseline.   Psychiatric:         Mood and Affect: Mood and affect normal.         Behavior: Behavior normal.         Thought Content: Thought content normal.         Judgment: Judgment normal.        Result Review :  The following data was reviewed by: Micah Maurice DO on 12/06/2023:  Common labs          12/6/2023    15:32   Common Labs   Glucose 84    BUN 16    Creatinine 0.98    Sodium 139    Potassium 3.8    Chloride 102    Calcium 9.3    Albumin 4.8    Total Bilirubin 0.4    Alkaline Phosphatase 59    AST (SGOT) 22    ALT " (SGPT) 27    WBC 9.50    Hemoglobin 14.0    Hematocrit 39.5    Platelets 253    Total Cholesterol 177    Triglycerides 410    HDL Cholesterol 32    LDL Cholesterol  79                   Assessment and Plan   Diagnoses and all orders for this visit:    1. Back pain, unspecified back location, unspecified back pain laterality, unspecified chronicity (Primary)  -     CBC & Differential; Future  -     Comprehensive metabolic panel; Future  -     Lipid panel; Future  -     TSH+Free T4; Future    2. Hypertriglyceridemia    Other orders  -     predniSONE (DELTASONE) 20 MG tablet; Take 1 tablet by mouth 2 (Two) Times a Day for 7 days, THEN 1 tablet Daily for 7 days.  Dispense: 21 tablet; Refill: 0  -     diclofenac (VOLTAREN) 75 MG EC tablet; Take 1 tablet by mouth 2 (Two) Times a Day As Needed (pain with food).  Dispense: 60 tablet; Refill: 2      Back pain likely from overuse strain or flareup of degenerative disc and disc bulging in the past recommend light activity steroids prescribed follow-up precautions given further imaging if indicated physical therapy and other if needed    Labs for screening for patient given age and risk factors monitor blood pressure at home goal less than 140/90         Follow Up   Return if symptoms worsen or fail to improve, for Next scheduled follow up.  Patient was given instructions and counseling regarding his condition or for health maintenance advice. Please see specific information pulled into the AVS if appropriate.

## 2023-12-07 ENCOUNTER — PATIENT ROUNDING (BHMG ONLY) (OUTPATIENT)
Dept: FAMILY MEDICINE CLINIC | Facility: CLINIC | Age: 38
End: 2023-12-07
Payer: COMMERCIAL

## 2024-05-16 ENCOUNTER — PATIENT MESSAGE (OUTPATIENT)
Dept: FAMILY MEDICINE CLINIC | Facility: CLINIC | Age: 39
End: 2024-05-16
Payer: COMMERCIAL

## 2024-05-16 DIAGNOSIS — H61.20 IMPACTED CERUMEN, UNSPECIFIED LATERALITY: Primary | ICD-10-CM

## 2024-05-16 NOTE — TELEPHONE ENCOUNTER
From: Omid Mathias  To: Micah Maurice  Sent: 5/16/2024 8:24 AM EDT  Subject: Ear doctor    Adelina Obrien,    Can you give me a referral to an ear specialist? I am having a lot of trouble hearing out of my left ear. And I have a lot of wax build up.

## 2024-07-05 ENCOUNTER — OFFICE VISIT (OUTPATIENT)
Dept: FAMILY MEDICINE CLINIC | Facility: CLINIC | Age: 39
End: 2024-07-05
Payer: COMMERCIAL

## 2024-07-05 VITALS
RESPIRATION RATE: 18 BRPM | DIASTOLIC BLOOD PRESSURE: 70 MMHG | SYSTOLIC BLOOD PRESSURE: 105 MMHG | TEMPERATURE: 97.7 F | WEIGHT: 228 LBS | HEART RATE: 60 BPM | BODY MASS INDEX: 29.26 KG/M2 | OXYGEN SATURATION: 96 % | HEIGHT: 74 IN

## 2024-07-05 DIAGNOSIS — M54.50 ACUTE MIDLINE LOW BACK PAIN WITHOUT SCIATICA: Primary | ICD-10-CM

## 2024-07-05 DIAGNOSIS — E78.2 MIXED HYPERLIPIDEMIA: Chronic | ICD-10-CM

## 2024-07-05 DIAGNOSIS — F33.9 EPISODE OF RECURRENT MAJOR DEPRESSIVE DISORDER, UNSPECIFIED DEPRESSION EPISODE SEVERITY: ICD-10-CM

## 2024-07-05 PROBLEM — E78.5 HYPERLIPIDEMIA: Chronic | Status: ACTIVE | Noted: 2021-04-29

## 2024-07-05 PROCEDURE — 99214 OFFICE O/P EST MOD 30 MIN: CPT | Performed by: FAMILY MEDICINE

## 2024-07-05 RX ORDER — DICLOFENAC SODIUM 75 MG/1
75 TABLET, DELAYED RELEASE ORAL 2 TIMES DAILY
Qty: 60 TABLET | Refills: 5 | Status: SHIPPED | OUTPATIENT
Start: 2024-07-05

## 2024-07-05 RX ORDER — METHOCARBAMOL 750 MG/1
750 TABLET, FILM COATED ORAL 4 TIMES DAILY PRN
Qty: 120 TABLET | Refills: 5 | Status: SHIPPED | OUTPATIENT
Start: 2024-07-05

## 2024-07-05 RX ORDER — ESCITALOPRAM OXALATE 10 MG/1
10 TABLET ORAL DAILY
Qty: 30 TABLET | Refills: 2 | Status: SHIPPED | OUTPATIENT
Start: 2024-07-05

## 2024-07-05 NOTE — PROGRESS NOTES
"Chief Complaint  Back Pain    Subjective        Omid Mathias presents to [facility]  History of Present Illness  The patient is here today for the management of acute and chronic medical conditions.  He has anxiety, depression, obstructive sleep apnea and history of back pain.    The patient is here with his wife who is helping with his history.  He has a history of chronic back pain and has been on gabapentin in the past.      Objective   Vital Signs:  /70 (BP Location: Left arm, Patient Position: Sitting, Cuff Size: Adult)   Pulse 60   Temp 97.7 °F (36.5 °C) (Tympanic)   Resp 18   Ht 188 cm (74.02\")   Wt 103 kg (228 lb)   SpO2 96%   BMI 29.26 kg/m²   Estimated body mass index is 29.26 kg/m² as calculated from the following:    Height as of this encounter: 188 cm (74.02\").    Weight as of this encounter: 103 kg (228 lb).               Physical Exam  Vitals reviewed.   Constitutional:       Appearance: He is well-developed and overweight.   HENT:      Head: Normocephalic and atraumatic.      Right Ear: External ear normal.      Left Ear: External ear normal.      Mouth/Throat:      Pharynx: No oropharyngeal exudate.   Eyes:      Conjunctiva/sclera: Conjunctivae normal.      Pupils: Pupils are equal, round, and reactive to light.   Neck:      Vascular: No carotid bruit.   Cardiovascular:      Rate and Rhythm: Normal rate and regular rhythm.      Heart sounds: No murmur heard.     No friction rub. No gallop.   Pulmonary:      Effort: Pulmonary effort is normal.      Breath sounds: Normal breath sounds. No wheezing or rhonchi.   Abdominal:      General: There is no distension.   Skin:     General: Skin is warm and dry.   Neurological:      Mental Status: He is alert and oriented to person, place, and time.      Cranial Nerves: No cranial nerve deficit.      Motor: No weakness.   Psychiatric:         Mood and Affect: Mood and affect normal.         Behavior: Behavior " normal.         Thought Content: Thought content normal.         Judgment: Judgment normal.        Result Review :      CMP          12/6/2023    15:32   CMP   Glucose 84    BUN 16    Creatinine 0.98    EGFR 100.6    Sodium 139    Potassium 3.8    Chloride 102    Calcium 9.3    Total Protein 7.4    Albumin 4.8    Globulin 2.6    Total Bilirubin 0.4    Alkaline Phosphatase 59    AST (SGOT) 22    ALT (SGPT) 27    Albumin/Globulin Ratio 1.8    BUN/Creatinine Ratio 16.3    Anion Gap 11.4      CBC          12/6/2023    15:32   CBC   WBC 9.50    RBC 4.53    Hemoglobin 14.0    Hematocrit 39.5    MCV 87.2    MCH 30.9    MCHC 35.4    RDW 12.7    Platelets 253      Lipid Panel          12/6/2023    15:32   Lipid Panel   Total Cholesterol 177    Triglycerides 410    HDL Cholesterol 32    VLDL Cholesterol 66    LDL Cholesterol  79    LDL/HDL Ratio 1.97      TSH          12/6/2023    15:32   TSH   TSH 1.980                   Assessment and Plan     Diagnoses and all orders for this visit:    1. Acute midline low back pain without sciatica (Primary)  Comments:  The patient was given orders today for physical therapy as well as diclofenac and Robaxin.  Orders:  -     diclofenac (VOLTAREN) 75 MG EC tablet; Take 1 tablet by mouth 2 (Two) Times a Day.  Dispense: 60 tablet; Refill: 5  -     methocarbamol (ROBAXIN) 750 MG tablet; Take 1 tablet by mouth 4 (Four) Times a Day As Needed for Muscle Spasms.  Dispense: 120 tablet; Refill: 5  -     Ambulatory Referral to Physical Therapy    2. Episode of recurrent major depressive disorder, unspecified depression episode severity  Assessment & Plan:  Patient's depression is a recurrent episode that is moderate without psychosis. Depression is active and worsening.    Plan:   Begin new antidepressant medicine; Lexapro 10 mg daily    Followup in 4 weeks.     Orders:  -     escitalopram (Lexapro) 10 MG tablet; Take 1 tablet by mouth Daily.  Dispense: 30 tablet; Refill: 2    3. Mixed  hyperlipidemia  Assessment & Plan:   Lipid abnormalities are stable    Plan:  Continue same medication/s without change.   and the patient is not good at taking medications and is not taking his medicines this time..      Counseled patient on lifestyle modifications to help control hyperlipidemia.   Cholesterol lowering dietary information shared with patient.    Patient Treatment Goals:   LDL goal is under 100    Followup in 6 months.               Follow Up     Return in about 6 weeks (around 8/16/2024).  Patient was given instructions and counseling regarding his condition or for health maintenance advice. Please see specific information pulled into the AVS if appropriate.

## 2024-07-05 NOTE — ASSESSMENT & PLAN NOTE
Lipid abnormalities are stable    Plan:  Continue same medication/s without change.   and the patient is not good at taking medications and is not taking his medicines this time..      Counseled patient on lifestyle modifications to help control hyperlipidemia.   Cholesterol lowering dietary information shared with patient.    Patient Treatment Goals:   LDL goal is under 100    Followup in 6 months.

## 2024-07-05 NOTE — ASSESSMENT & PLAN NOTE
Patient's depression is a recurrent episode that is moderate without psychosis. Depression is active and worsening.    Plan:   Begin new antidepressant medicine; Lexapro 10 mg daily    Followup in 4 weeks.

## 2025-04-06 ENCOUNTER — HOSPITAL ENCOUNTER (EMERGENCY)
Facility: HOSPITAL | Age: 40
Discharge: HOME | End: 2025-04-06
Attending: EMERGENCY MEDICINE | Admitting: EMERGENCY MEDICINE
Payer: COMMERCIAL

## 2025-04-06 ENCOUNTER — APPOINTMENT (OUTPATIENT)
Dept: GENERAL RADIOLOGY | Facility: HOSPITAL | Age: 40
End: 2025-04-06
Payer: COMMERCIAL

## 2025-04-06 VITALS
WEIGHT: 220 LBS | DIASTOLIC BLOOD PRESSURE: 87 MMHG | HEART RATE: 77 BPM | RESPIRATION RATE: 14 BRPM | HEIGHT: 74 IN | OXYGEN SATURATION: 96 % | SYSTOLIC BLOOD PRESSURE: 126 MMHG | BODY MASS INDEX: 28.23 KG/M2 | TEMPERATURE: 98.2 F

## 2025-04-06 DIAGNOSIS — S53.106A: Primary | ICD-10-CM

## 2025-04-06 PROCEDURE — 25010000002 PROPOFOL 200 MG/20ML EMULSION: Performed by: EMERGENCY MEDICINE

## 2025-04-06 PROCEDURE — 96375 TX/PRO/DX INJ NEW DRUG ADDON: CPT

## 2025-04-06 PROCEDURE — 73070 X-RAY EXAM OF ELBOW: CPT

## 2025-04-06 PROCEDURE — 96374 THER/PROPH/DIAG INJ IV PUSH: CPT

## 2025-04-06 PROCEDURE — 25810000003 SODIUM CHLORIDE 0.9 % SOLUTION: Performed by: EMERGENCY MEDICINE

## 2025-04-06 PROCEDURE — 25010000002 ONDANSETRON PER 1 MG: Performed by: EMERGENCY MEDICINE

## 2025-04-06 PROCEDURE — 96376 TX/PRO/DX INJ SAME DRUG ADON: CPT

## 2025-04-06 PROCEDURE — 25010000002 HYDROMORPHONE 1 MG/ML SOLUTION: Performed by: EMERGENCY MEDICINE

## 2025-04-06 PROCEDURE — 99285 EMERGENCY DEPT VISIT HI MDM: CPT

## 2025-04-06 RX ORDER — ONDANSETRON 4 MG/1
4 TABLET, ORALLY DISINTEGRATING ORAL EVERY 8 HOURS PRN
Qty: 15 TABLET | Refills: 0 | Status: SHIPPED | OUTPATIENT
Start: 2025-04-06

## 2025-04-06 RX ORDER — SODIUM CHLORIDE 0.9 % (FLUSH) 0.9 %
10 SYRINGE (ML) INJECTION AS NEEDED
Status: DISCONTINUED | OUTPATIENT
Start: 2025-04-06 | End: 2025-04-06 | Stop reason: HOSPADM

## 2025-04-06 RX ORDER — ONDANSETRON 2 MG/ML
4 INJECTION INTRAMUSCULAR; INTRAVENOUS ONCE
Status: COMPLETED | OUTPATIENT
Start: 2025-04-06 | End: 2025-04-06

## 2025-04-06 RX ORDER — OXYCODONE AND ACETAMINOPHEN 5; 325 MG/1; MG/1
1 TABLET ORAL EVERY 6 HOURS PRN
Qty: 12 TABLET | Refills: 0 | Status: SHIPPED | OUTPATIENT
Start: 2025-04-06 | End: 2025-04-10 | Stop reason: ALTCHOICE

## 2025-04-06 RX ADMIN — ONDANSETRON 4 MG: 2 INJECTION INTRAMUSCULAR; INTRAVENOUS at 20:26

## 2025-04-06 RX ADMIN — SODIUM CHLORIDE 1000 ML: 9 INJECTION, SOLUTION INTRAVENOUS at 19:40

## 2025-04-06 RX ADMIN — PROPOFOL 14 ML: 10 INJECTION, EMULSION INTRAVENOUS at 19:02

## 2025-04-06 RX ADMIN — PROPOFOL 17 ML: 10 INJECTION, EMULSION INTRAVENOUS at 18:09

## 2025-04-06 RX ADMIN — ONDANSETRON 4 MG: 2 INJECTION INTRAMUSCULAR; INTRAVENOUS at 19:40

## 2025-04-06 RX ADMIN — HYDROMORPHONE HYDROCHLORIDE 1 MG: 1 INJECTION, SOLUTION INTRAMUSCULAR; INTRAVENOUS; SUBCUTANEOUS at 17:51

## 2025-04-07 ENCOUNTER — TELEPHONE (OUTPATIENT)
Dept: ORTHOPEDIC SURGERY | Facility: CLINIC | Age: 40
End: 2025-04-07

## 2025-04-07 NOTE — TELEPHONE ENCOUNTER
"    Caller: Omid Mathias \"Ketan\"    Relationship to patient: Self    Best call back number: [phone]    Chief complaint: Dislocation of elbow, unspecified laterality, initial encounter     Type of visit: NEW PATIENT     Requested date: ASAP     If rescheduling, when is the original appointment: 4/10/2025     Additional notes: PATIENT CALLING TO GET SCHEDULED SOONER- REFERRAL IS PLACED FROM ED.  "

## 2025-04-10 ENCOUNTER — OFFICE VISIT (OUTPATIENT)
Dept: ORTHOPEDIC SURGERY | Facility: CLINIC | Age: 40
End: 2025-04-10
Payer: COMMERCIAL

## 2025-04-10 VITALS
WEIGHT: 220 LBS | BODY MASS INDEX: 28.23 KG/M2 | HEIGHT: 74 IN | SYSTOLIC BLOOD PRESSURE: 123 MMHG | OXYGEN SATURATION: 95 % | HEART RATE: 61 BPM | DIASTOLIC BLOOD PRESSURE: 83 MMHG

## 2025-04-10 DIAGNOSIS — S53.104A DISLOCATION OF RIGHT ELBOW, INITIAL ENCOUNTER: ICD-10-CM

## 2025-04-10 DIAGNOSIS — M25.521 RIGHT ELBOW PAIN: Primary | ICD-10-CM

## 2025-04-10 RX ORDER — OXYCODONE AND ACETAMINOPHEN 7.5; 325 MG/1; MG/1
1 TABLET ORAL EVERY 8 HOURS PRN
Qty: 21 TABLET | Refills: 0 | Status: SHIPPED | OUTPATIENT
Start: 2025-04-10

## 2025-04-10 NOTE — PROGRESS NOTES
"Chief Complaint  Initial Evaluation of the Right Elbow     Subjective      Omid Mathias presents to [facility] for initial evaluation of the right elbow.  He was wrestling around with his son on 25 and he fell and his son fell on to[p of him.  He went to ER and the Dr tried to get it back in place and then Dr Campbell tried to get it back in place.  His elbow went back into place and placed in a splint and sling.  He has a lot of night time pain.      No Known Allergies     Social History     Socioeconomic History    Marital status:    Tobacco Use    Smoking status: Former     Current packs/day: 0.00     Average packs/day: 1 pack/day for 6.0 years (6.0 ttl pk-yrs)     Types: Cigarettes     Start date:      Quit date:      Years since quittin.2     Passive exposure: Never    Smokeless tobacco: Never   Vaping Use    Vaping status: Never Used   Substance and Sexual Activity    Alcohol use: Never    Drug use: Never    Sexual activity: Defer        I reviewed the patient's chief complaint, history of present illness, review of systems, past medical history, surgical history, family history, social history, medications, and allergy list.     Review of Systems     Constitutional: Denies fevers, chills, weight loss  Cardiovascular: Denies chest pain, shortness of breath  Skin: Denies rashes, acute skin changes  Neurologic: Denies headache, loss of consciousness        Vital Signs:   /83   Pulse 61   Ht 188 cm (74\")   Wt 99.8 kg (220 lb)   SpO2 95%   BMI 28.25 kg/m²          Physical Exam  General: Alert. No acute distress    Ortho Exam        RIGHT ELBOW Sensation to light touch median, radial, ulnar nerve. Positive AIN, PIN, ulnar nerve motor function. Axillary sensation intact. Full ROM of the wrist and hand.  Mild swelling.        Procedures      Imaging Results (Most Recent)       Procedure Component Value Units Date/Time    XR Elbow 2 View Right " [[id number]] Resulted: 04/10/25 1532     Updated: 04/10/25 1532             Result Review :     X-Ray Report:  Right elbow X-Ray  Indication: Evaluation of the right elbow  AP/Lateral view(s)  Findings: Concentric reduction no obvious fracture.  No further displacement from previous imaging.   Prior studies available for comparison: no       XR Elbow 2 View Right  Result Date: 4/6/2025  Narrative: XR ELBOW 2 VW RIGHT Date of Exam: 4/6/2025 7:13 PM EDT Indication: post reduction Comparison: Right elbow/6/25 Findings: Post reduction images of the right elbow demonstrate anatomic alignment status post posterior dislocation. There is some mild irregularity involving the coronoid process likely small avulsive injury.     Impression: Impression: Anatomic alignment status post reduction of the right elbow with suspected coronoid process fracture. Electronically Signed: Lolita Triana MD  4/6/2025 7:37 PM EDT  Workstation ID: [id number]    XR Elbow 2 View Right  Result Date: 4/6/2025  Narrative: XR ELBOW 2 VW RIGHT Date of Exam: 4/6/2025 4:51 PM EDT Indication: right elbow pain, fall Comparison: None available. Findings: There is dislocation at the elbow joints with posterior displacement of the radius and ulna with respect to the distal humerus. Small triangular 2 to 3 mm ossific density projects proximal to the radial head, coronoid process on the lateral view suggesting a small fracture fragment. No definite fracture lines otherwise appreciated     Impression: Impression: 1. Dislocation of the right elbow with probable small avulsion fracture fragment as above Electronically Signed: Segundo Martin MD  4/6/2025 5:00 PM EDT  Workstation ID: [id number]             Assessment and Plan     Diagnoses and all orders for this visit:    1. Right elbow pain (Primary)  -     XR Elbow 2 View Right    2. Dislocation of right elbow, initial encounter        Discussed the treatment plan with the patient. I reviewed the X-rays that were  obtained 4/6/25 with the patient.     Continue in splint for 1 more week splint and sling.      Prescribed pain medication.     Will obtain X-Rays of the right elbow at next visit after the splint is removed.    Educated on risk of smoking/nicotine. Discussed options for smoking cessation regarding chantix, nicorette gum and/ or to call the quit hotline at [phone]  and DME order for a 3 in 1 given today due to patient will be confined to one room/level of the home that does not offer a toilet during postop recovery.     Follow Up     1 week with X ray after the splint is removed.  Will discuss rehab at the next visit.       Patient was given instructions and counseling regarding his condition or for health maintenance advice. Please see specific information pulled into the AVS if appropriate.     Scribed for Raj Campbell MD by Jennifer Ruth MA.  04/10/25   15:10 EDT    I have personally performed the services described in this document as scribed by the above individual and it is both accurate and complete. Raj Campbell MD 04/10/25

## 2025-04-22 ENCOUNTER — OFFICE VISIT (OUTPATIENT)
Dept: ORTHOPEDIC SURGERY | Facility: CLINIC | Age: 40
End: 2025-04-22
Payer: COMMERCIAL

## 2025-04-22 VITALS — HEART RATE: 51 BPM | DIASTOLIC BLOOD PRESSURE: 80 MMHG | SYSTOLIC BLOOD PRESSURE: 117 MMHG | OXYGEN SATURATION: 94 %

## 2025-04-22 DIAGNOSIS — S53.104D DISLOCATION OF RIGHT ELBOW, SUBSEQUENT ENCOUNTER: ICD-10-CM

## 2025-04-22 DIAGNOSIS — M25.521 RIGHT ELBOW PAIN: Primary | ICD-10-CM

## 2025-04-22 RX ORDER — ESCITALOPRAM OXALATE 5 MG/1
5 TABLET ORAL DAILY
COMMUNITY

## 2025-04-22 NOTE — PROGRESS NOTES
Chief Complaint  Follow-up of the Right Elbow     Subjective      Omid Mathias presents to [facility] for follow up of the right elbow. He was wrestling around with his son on 25 and he fell and his son fell on top of him. His elbow had popped out of place and then it popped back into place.   He is here today for another X ray of there right elbow.  He has been wearing a splint the last couple of weeks.      No Known Allergies     Social History     Socioeconomic History    Marital status:    Tobacco Use    Smoking status: Former     Current packs/day: 0.00     Average packs/day: 1 pack/day for 6.0 years (6.0 ttl pk-yrs)     Types: Cigarettes     Start date:      Quit date:      Years since quittin.3     Passive exposure: Never    Smokeless tobacco: Never   Vaping Use    Vaping status: Never Used   Substance and Sexual Activity    Alcohol use: Never    Drug use: Never    Sexual activity: Defer        I reviewed the patient's chief complaint, history of present illness, review of systems, past medical history, surgical history, family history, social history, medications, and allergy list.     Review of Systems     Constitutional: Denies fevers, chills, weight loss  Cardiovascular: Denies chest pain, shortness of breath  Skin: Denies rashes, acute skin changes  Neurologic: Denies headache, loss of consciousness      Vital Signs:   /80   Pulse 51   SpO2 94%          Physical Exam  General: Alert. No acute distress    Ortho Exam        RIGHT ELBOW Full Full , thumb opposition, MCP flexors, DIP flexors and PIP flexors. Pain with pronation and supination.  Sensation to light touch median, radial, ulnar nerve. Positive AIN, PIN, ulnar nerve motor function. Axillary sensation intact. Full ROM of the wrist and hand.  Mild swelling.  -      Procedures      Imaging Results (Most Recent)       Procedure Component Value Units Date/Time    XR Elbow 2  View Right [[id number]] Resulted: 04/22/25 1253     Updated: 04/22/25 1254             Result Review :     X-Ray Report:  Right elbow X-Ray  Indication: Evaluation of the right elbow   AP/Lateral view(s)  Findings: Good reduction no fracture.    Prior studies available for comparison: yes       XR Elbow 2 View Right  Result Date: 4/10/2025  Narrative: X-Ray Report: Right elbow X-Ray Indication: Evaluation of the right elbow AP/Lateral view(s) Findings: Concentric reduction no obvious fracture.  No further displacement from previous imaging. Prior studies available for comparison: no     XR Elbow 2 View Right  Result Date: 4/6/2025  Narrative: XR ELBOW 2 VW RIGHT Date of Exam: 4/6/2025 7:13 PM EDT Indication: post reduction Comparison: Right elbow/6/25 Findings: Post reduction images of the right elbow demonstrate anatomic alignment status post posterior dislocation. There is some mild irregularity involving the coronoid process likely small avulsive injury.     Impression: Impression: Anatomic alignment status post reduction of the right elbow with suspected coronoid process fracture. Electronically Signed: Lolita Triana MD  4/6/2025 7:37 PM EDT  Workstation ID: [id number]    XR Elbow 2 View Right  Result Date: 4/6/2025  Narrative: XR ELBOW 2 VW RIGHT Date of Exam: 4/6/2025 4:51 PM EDT Indication: right elbow pain, fall Comparison: None available. Findings: There is dislocation at the elbow joints with posterior displacement of the radius and ulna with respect to the distal humerus. Small triangular 2 to 3 mm ossific density projects proximal to the radial head, coronoid process on the lateral view suggesting a small fracture fragment. No definite fracture lines otherwise appreciated     Impression: Impression: 1. Dislocation of the right elbow with probable small avulsion fracture fragment as above Electronically Signed: Segundo Martin MD  4/6/2025 5:00 PM EDT  Workstation ID: [id number]             Assessment and  Plan     Diagnoses and all orders for this visit:    1. Right elbow pain (Primary)  -     XR Elbow 2 View Right    2. Dislocation of right elbow, subsequent encounter        Discussed the treatment plan with the patient. I reviewed the X-rays that were obtained today with the patient.     Prescribed physical therapy.  Come out of the splint. HEP exercises given.      Brace given to use at work for protection.        Call or return if worsening symptoms.    Follow Up     2 weeks to assess ROM.        Patient was given instructions and counseling regarding his condition or for health maintenance advice. Please see specific information pulled into the AVS if appropriate.     Scribed for Raj Campbell MD by Jennifer Ruth MA.  04/22/25   12:53 EDT    I have personally performed the services described in this document as scribed by the above individual and it is both accurate and complete. Raj Campbell MD 04/22/25